# Patient Record
Sex: FEMALE | Race: WHITE | Employment: OTHER | URBAN - METROPOLITAN AREA
[De-identification: names, ages, dates, MRNs, and addresses within clinical notes are randomized per-mention and may not be internally consistent; named-entity substitution may affect disease eponyms.]

---

## 2017-02-07 ENCOUNTER — HOSPITAL ENCOUNTER (OUTPATIENT)
Dept: PREADMISSION TESTING | Age: 82
Discharge: HOME | End: 2017-02-07
Payer: MEDICARE

## 2017-02-07 ENCOUNTER — HOSPITAL ENCOUNTER (OUTPATIENT)
Dept: GENERAL RADIOLOGY | Age: 82
Discharge: HOME | End: 2017-02-07
Attending: SPECIALIST
Payer: MEDICARE

## 2017-02-07 VITALS
HEIGHT: 68 IN | DIASTOLIC BLOOD PRESSURE: 77 MMHG | HEART RATE: 68 BPM | WEIGHT: 121 LBS | BODY MASS INDEX: 18.34 KG/M2 | SYSTOLIC BLOOD PRESSURE: 144 MMHG

## 2017-02-07 DIAGNOSIS — Z01.811 PRE-OP CHEST EXAM: ICD-10-CM

## 2017-02-07 LAB
ALBUMIN SERPL BCP-MCNC: 4 G/DL (ref 3.5–5)
ALBUMIN/GLOB SERPL: 1.7 {RATIO} (ref 1.1–2.2)
ALP SERPL-CCNC: 62 U/L (ref 45–117)
ALT SERPL-CCNC: 27 U/L (ref 12–78)
ANION GAP BLD CALC-SCNC: 8 MMOL/L (ref 5–15)
AST SERPL W P-5'-P-CCNC: 24 U/L (ref 15–37)
BASOPHILS # BLD AUTO: 0 K/UL (ref 0–0.1)
BASOPHILS # BLD: 0 % (ref 0–1)
BILIRUB SERPL-MCNC: 0.4 MG/DL (ref 0.2–1)
BUN SERPL-MCNC: 18 MG/DL (ref 6–20)
BUN/CREAT SERPL: 28 (ref 12–20)
CALCIUM SERPL-MCNC: 9.6 MG/DL (ref 8.5–10.1)
CHLORIDE SERPL-SCNC: 101 MMOL/L (ref 97–108)
CO2 SERPL-SCNC: 29 MMOL/L (ref 21–32)
CREAT SERPL-MCNC: 0.64 MG/DL (ref 0.55–1.02)
EOSINOPHIL # BLD: 0.1 K/UL (ref 0–0.4)
EOSINOPHIL NFR BLD: 2 % (ref 0–7)
ERYTHROCYTE [DISTWIDTH] IN BLOOD BY AUTOMATED COUNT: 13.1 % (ref 11.5–14.5)
GLOBULIN SER CALC-MCNC: 2.4 G/DL (ref 2–4)
GLUCOSE SERPL-MCNC: 83 MG/DL (ref 65–100)
HCT VFR BLD AUTO: 38.6 % (ref 35–47)
HGB BLD-MCNC: 12.6 G/DL (ref 11.5–16)
LYMPHOCYTES # BLD AUTO: 24 % (ref 12–49)
LYMPHOCYTES # BLD: 1.6 K/UL (ref 0.8–3.5)
MCH RBC QN AUTO: 30.8 PG (ref 26–34)
MCHC RBC AUTO-ENTMCNC: 32.6 G/DL (ref 30–36.5)
MCV RBC AUTO: 94.4 FL (ref 80–99)
MONOCYTES # BLD: 0.7 K/UL (ref 0–1)
MONOCYTES NFR BLD AUTO: 10 % (ref 5–13)
NEUTS SEG # BLD: 4.1 K/UL (ref 1.8–8)
NEUTS SEG NFR BLD AUTO: 64 % (ref 32–75)
PLATELET # BLD AUTO: 315 K/UL (ref 150–400)
POTASSIUM SERPL-SCNC: 3.8 MMOL/L (ref 3.5–5.1)
PROT SERPL-MCNC: 6.4 G/DL (ref 6.4–8.2)
RBC # BLD AUTO: 4.09 M/UL (ref 3.8–5.2)
SODIUM SERPL-SCNC: 138 MMOL/L (ref 136–145)
WBC # BLD AUTO: 6.4 K/UL (ref 3.6–11)

## 2017-02-07 PROCEDURE — 80053 COMPREHEN METABOLIC PANEL: CPT | Performed by: SPECIALIST

## 2017-02-07 PROCEDURE — 36415 COLL VENOUS BLD VENIPUNCTURE: CPT | Performed by: SPECIALIST

## 2017-02-07 PROCEDURE — 71020 XR CHEST PA LAT: CPT

## 2017-02-07 PROCEDURE — 85025 COMPLETE CBC W/AUTO DIFF WBC: CPT | Performed by: SPECIALIST

## 2017-02-07 RX ORDER — BENZONATATE 200 MG/1
200 CAPSULE ORAL AS NEEDED
COMMUNITY

## 2017-02-07 RX ORDER — GLUCOSAMINE SULFATE 1500 MG
POWDER IN PACKET (EA) ORAL DAILY
COMMUNITY

## 2017-02-07 NOTE — PERIOP NOTES
PREOPERATIVE INSTRUCTIONS REVIEWED WITH PATIENT. PATIENT WAS GIVEN THE  OPPORTUNITY TO ASK QUESTIONS ON THE INFORMATION PROVIDED.

## 2017-02-08 NOTE — PERIOP NOTES
EKG SEEN BY  [address]. THIS INFORMATION WAS FAXED TO  [address] [place] [address],[address].

## 2017-02-08 NOTE — PERIOP NOTES
EKG FAXED TO Sagar Donovan PCP TO [facility] AS REQUESTED BY HER. LEFT VOICEMAIL MSG REGARDING EKG BEING FROM 12/2/16  RECEIVED ORDERS FROM [facility]. 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 ORDER FAXED TO DR. SALAS TO INFORM HIM. ORDER PLACED ON TOP OF CHART FOR HOLDING FOR DOS.

## 2017-02-14 ENCOUNTER — ANESTHESIA (OUTPATIENT)
Dept: SURGERY | Age: 82
End: 2017-02-14
Payer: MEDICARE

## 2017-02-14 ENCOUNTER — ANESTHESIA EVENT (OUTPATIENT)
Dept: SURGERY | Age: 82
End: 2017-02-14
Payer: MEDICARE

## 2017-02-14 ENCOUNTER — HOSPITAL ENCOUNTER (OUTPATIENT)
Age: 82
Setting detail: OUTPATIENT SURGERY
Discharge: HOME | End: 2017-02-14
Attending: SPECIALIST | Admitting: SPECIALIST
Payer: MEDICARE

## 2017-02-14 ENCOUNTER — SURGERY (OUTPATIENT)
Age: 82
End: 2017-02-14

## 2017-02-14 VITALS
HEIGHT: 68 IN | HEART RATE: 73 BPM | SYSTOLIC BLOOD PRESSURE: 138 MMHG | DIASTOLIC BLOOD PRESSURE: 62 MMHG | BODY MASS INDEX: 18.34 KG/M2 | RESPIRATION RATE: 16 BRPM | WEIGHT: 121 LBS | TEMPERATURE: 97.3 F | OXYGEN SATURATION: 99 %

## 2017-02-14 PROCEDURE — 74011250636 HC RX REV CODE- 250/636

## 2017-02-14 PROCEDURE — 74011000272 HC RX REV CODE- 272: Performed by: SPECIALIST

## 2017-02-14 PROCEDURE — 76010000149 HC OR TIME 1 TO 1.5 HR: Performed by: SPECIALIST

## 2017-02-14 PROCEDURE — 77030008684 HC TU ET CUF COVD -B: Performed by: ANESTHESIOLOGY

## 2017-02-14 PROCEDURE — 77030013079 HC BLNKT BAIR HGGR 3M -A: Performed by: ANESTHESIOLOGY

## 2017-02-14 PROCEDURE — 74011250637 HC RX REV CODE- 250/637: Performed by: SPECIALIST

## 2017-02-14 PROCEDURE — 77030026438 HC STYL ET INTUB CARD -A: Performed by: ANESTHESIOLOGY

## 2017-02-14 PROCEDURE — 76060000033 HC ANESTHESIA 1 TO 1.5 HR: Performed by: SPECIALIST

## 2017-02-14 PROCEDURE — 77030008570 HC TBNG SUC IRR GRAC -B: Performed by: SPECIALIST

## 2017-02-14 PROCEDURE — 77030018846 HC SOL IRR STRL H20 ICUM -A: Performed by: SPECIALIST

## 2017-02-14 PROCEDURE — L8613 OSSICULAR IMPLANT: HCPCS | Performed by: SPECIALIST

## 2017-02-14 PROCEDURE — 77030006932 HC BLD TYMP BVR BD -B: Performed by: SPECIALIST

## 2017-02-14 PROCEDURE — 77030020782 HC GWN BAIR PAWS FLX 3M -B

## 2017-02-14 PROCEDURE — 74011000258 HC RX REV CODE- 258: Performed by: SPECIALIST

## 2017-02-14 PROCEDURE — 77030002974 HC SUT PLN J&J -A: Performed by: SPECIALIST

## 2017-02-14 PROCEDURE — 77030019908 HC STETH ESOPH SIMS -A: Performed by: ANESTHESIOLOGY

## 2017-02-14 PROCEDURE — 76210000021 HC REC RM PH II 0.5 TO 1 HR: Performed by: SPECIALIST

## 2017-02-14 PROCEDURE — 74011000250 HC RX REV CODE- 250

## 2017-02-14 PROCEDURE — 77030032490 HC SLV COMPR SCD KNE COVD -B: Performed by: SPECIALIST

## 2017-02-14 PROCEDURE — 77030006689 HC BLD OPHTH BVR BD -A: Performed by: SPECIALIST

## 2017-02-14 PROCEDURE — 74011250636 HC RX REV CODE- 250/636: Performed by: SPECIALIST

## 2017-02-14 PROCEDURE — 77030018836 HC SOL IRR NACL ICUM -A: Performed by: SPECIALIST

## 2017-02-14 PROCEDURE — 76210000016 HC OR PH I REC 1 TO 1.5 HR: Performed by: SPECIALIST

## 2017-02-14 PROCEDURE — 74011000250 HC RX REV CODE- 250: Performed by: SPECIALIST

## 2017-02-14 DEVICE — OFFSET ALTO PARTIAL SIZERS INCLUDED TITANIUM/SILICONE
Type: IMPLANTABLE DEVICE | Site: EAR | Status: FUNCTIONAL
Brand: OFFSET ALTO PARTIAL

## 2017-02-14 RX ORDER — ONDANSETRON 2 MG/ML
4 INJECTION INTRAMUSCULAR; INTRAVENOUS AS NEEDED
Status: DISCONTINUED | OUTPATIENT
Start: 2017-02-14 | End: 2017-02-14 | Stop reason: HOSPADM

## 2017-02-14 RX ORDER — FENTANYL CITRATE 50 UG/ML
INJECTION, SOLUTION INTRAMUSCULAR; INTRAVENOUS AS NEEDED
Status: DISCONTINUED | OUTPATIENT
Start: 2017-02-14 | End: 2017-02-14 | Stop reason: HOSPADM

## 2017-02-14 RX ORDER — LIDOCAINE HYDROCHLORIDE 10 MG/ML
0.1 INJECTION, SOLUTION EPIDURAL; INFILTRATION; INTRACAUDAL; PERINEURAL AS NEEDED
Status: DISCONTINUED | OUTPATIENT
Start: 2017-02-14 | End: 2017-02-14 | Stop reason: HOSPADM

## 2017-02-14 RX ORDER — LIDOCAINE HYDROCHLORIDE AND EPINEPHRINE 10; 10 MG/ML; UG/ML
1.5 INJECTION, SOLUTION INFILTRATION; PERINEURAL ONCE
Status: COMPLETED | OUTPATIENT
Start: 2017-02-14 | End: 2017-02-14

## 2017-02-14 RX ORDER — IBUPROFEN 800 MG/1
800 TABLET ORAL
Qty: 60 TAB | Refills: 1 | Status: SHIPPED | OUTPATIENT
Start: 2017-02-14 | End: 2019-04-22

## 2017-02-14 RX ORDER — SODIUM CHLORIDE 0.9 % (FLUSH) 0.9 %
5-10 SYRINGE (ML) INJECTION AS NEEDED
Status: DISCONTINUED | OUTPATIENT
Start: 2017-02-14 | End: 2017-02-14 | Stop reason: HOSPADM

## 2017-02-14 RX ORDER — LIDOCAINE HYDROCHLORIDE 20 MG/ML
INJECTION, SOLUTION EPIDURAL; INFILTRATION; INTRACAUDAL; PERINEURAL AS NEEDED
Status: DISCONTINUED | OUTPATIENT
Start: 2017-02-14 | End: 2017-02-14 | Stop reason: HOSPADM

## 2017-02-14 RX ORDER — DIPHENHYDRAMINE HYDROCHLORIDE 50 MG/ML
12.5 INJECTION, SOLUTION INTRAMUSCULAR; INTRAVENOUS AS NEEDED
Status: DISCONTINUED | OUTPATIENT
Start: 2017-02-14 | End: 2017-02-14 | Stop reason: HOSPADM

## 2017-02-14 RX ORDER — AMOXICILLIN AND CLAVULANATE POTASSIUM 875; 125 MG/1; MG/1
1 TABLET, FILM COATED ORAL 2 TIMES DAILY
Qty: 14 TAB | Refills: 0 | Status: SHIPPED | OUTPATIENT
Start: 2017-02-14 | End: 2017-02-21

## 2017-02-14 RX ORDER — ACETAMINOPHEN 10 MG/ML
INJECTION, SOLUTION INTRAVENOUS AS NEEDED
Status: DISCONTINUED | OUTPATIENT
Start: 2017-02-14 | End: 2017-02-14 | Stop reason: HOSPADM

## 2017-02-14 RX ORDER — BACITRACIN ZINC 500 UNIT/G
OINTMENT (GRAM) TOPICAL DAILY
Status: DISCONTINUED | OUTPATIENT
Start: 2017-02-15 | End: 2017-02-14 | Stop reason: HOSPADM

## 2017-02-14 RX ORDER — MORPHINE SULFATE 10 MG/ML
2 INJECTION, SOLUTION INTRAMUSCULAR; INTRAVENOUS
Status: DISCONTINUED | OUTPATIENT
Start: 2017-02-14 | End: 2017-02-14 | Stop reason: HOSPADM

## 2017-02-14 RX ORDER — SUCCINYLCHOLINE CHLORIDE 20 MG/ML
INJECTION INTRAMUSCULAR; INTRAVENOUS AS NEEDED
Status: DISCONTINUED | OUTPATIENT
Start: 2017-02-14 | End: 2017-02-14 | Stop reason: HOSPADM

## 2017-02-14 RX ORDER — SODIUM CHLORIDE, SODIUM LACTATE, POTASSIUM CHLORIDE, CALCIUM CHLORIDE 600; 310; 30; 20 MG/100ML; MG/100ML; MG/100ML; MG/100ML
INJECTION, SOLUTION INTRAVENOUS
Status: DISCONTINUED | OUTPATIENT
Start: 2017-02-14 | End: 2017-02-14 | Stop reason: HOSPADM

## 2017-02-14 RX ORDER — DEXAMETHASONE SODIUM PHOSPHATE 4 MG/ML
INJECTION, SOLUTION INTRA-ARTICULAR; INTRALESIONAL; INTRAMUSCULAR; INTRAVENOUS; SOFT TISSUE AS NEEDED
Status: DISCONTINUED | OUTPATIENT
Start: 2017-02-14 | End: 2017-02-14 | Stop reason: HOSPADM

## 2017-02-14 RX ORDER — FENTANYL CITRATE 50 UG/ML
50 INJECTION, SOLUTION INTRAMUSCULAR; INTRAVENOUS AS NEEDED
Status: DISCONTINUED | OUTPATIENT
Start: 2017-02-14 | End: 2017-02-14 | Stop reason: HOSPADM

## 2017-02-14 RX ORDER — EPINEPHRINE NASAL SOLUTION 1 MG/ML
2 SOLUTION NASAL
Status: COMPLETED | OUTPATIENT
Start: 2017-02-14 | End: 2017-02-14

## 2017-02-14 RX ORDER — MIDAZOLAM HYDROCHLORIDE 1 MG/ML
1 INJECTION, SOLUTION INTRAMUSCULAR; INTRAVENOUS AS NEEDED
Status: DISCONTINUED | OUTPATIENT
Start: 2017-02-14 | End: 2017-02-14 | Stop reason: HOSPADM

## 2017-02-14 RX ORDER — PROPOFOL 10 MG/ML
INJECTION, EMULSION INTRAVENOUS AS NEEDED
Status: DISCONTINUED | OUTPATIENT
Start: 2017-02-14 | End: 2017-02-14 | Stop reason: HOSPADM

## 2017-02-14 RX ORDER — ROCURONIUM BROMIDE 10 MG/ML
INJECTION, SOLUTION INTRAVENOUS AS NEEDED
Status: DISCONTINUED | OUTPATIENT
Start: 2017-02-14 | End: 2017-02-14 | Stop reason: HOSPADM

## 2017-02-14 RX ORDER — SODIUM CHLORIDE, SODIUM LACTATE, POTASSIUM CHLORIDE, CALCIUM CHLORIDE 600; 310; 30; 20 MG/100ML; MG/100ML; MG/100ML; MG/100ML
125 INJECTION, SOLUTION INTRAVENOUS CONTINUOUS
Status: DISCONTINUED | OUTPATIENT
Start: 2017-02-14 | End: 2017-02-14 | Stop reason: HOSPADM

## 2017-02-14 RX ORDER — PHENYLEPHRINE HCL IN 0.9% NACL 0.4MG/10ML
SYRINGE (ML) INTRAVENOUS AS NEEDED
Status: DISCONTINUED | OUTPATIENT
Start: 2017-02-14 | End: 2017-02-14 | Stop reason: HOSPADM

## 2017-02-14 RX ORDER — OFLOXACIN 3 MG/ML
SOLUTION/ DROPS OPHTHALMIC AS NEEDED
Status: DISCONTINUED | OUTPATIENT
Start: 2017-02-14 | End: 2017-02-14 | Stop reason: HOSPADM

## 2017-02-14 RX ORDER — OXYCODONE AND ACETAMINOPHEN 5; 325 MG/1; MG/1
1 TABLET ORAL AS NEEDED
Status: DISCONTINUED | OUTPATIENT
Start: 2017-02-14 | End: 2017-02-14 | Stop reason: HOSPADM

## 2017-02-14 RX ORDER — MIDAZOLAM HYDROCHLORIDE 1 MG/ML
0.5 INJECTION, SOLUTION INTRAMUSCULAR; INTRAVENOUS
Status: DISCONTINUED | OUTPATIENT
Start: 2017-02-14 | End: 2017-02-14 | Stop reason: HOSPADM

## 2017-02-14 RX ORDER — SODIUM CHLORIDE 9 MG/ML
1000 INJECTION, SOLUTION INTRAVENOUS CONTINUOUS
Status: DISCONTINUED | OUTPATIENT
Start: 2017-02-14 | End: 2017-02-14 | Stop reason: HOSPADM

## 2017-02-14 RX ORDER — SODIUM CHLORIDE 9 MG/ML
50 INJECTION, SOLUTION INTRAVENOUS CONTINUOUS
Status: DISCONTINUED | OUTPATIENT
Start: 2017-02-14 | End: 2017-02-14 | Stop reason: HOSPADM

## 2017-02-14 RX ORDER — OFLOXACIN 3 MG/ML
5 SOLUTION AURICULAR (OTIC) 2 TIMES DAILY
Qty: 10 ML | Refills: 3 | Status: SHIPPED | OUTPATIENT
Start: 2017-02-14 | End: 2018-10-11

## 2017-02-14 RX ORDER — DEXAMETHASONE SODIUM PHOSPHATE 10 MG/ML
10 INJECTION INTRAMUSCULAR; INTRAVENOUS ONCE
Status: DISCONTINUED | OUTPATIENT
Start: 2017-02-14 | End: 2017-02-14 | Stop reason: HOSPADM

## 2017-02-14 RX ORDER — ONDANSETRON 2 MG/ML
INJECTION INTRAMUSCULAR; INTRAVENOUS AS NEEDED
Status: DISCONTINUED | OUTPATIENT
Start: 2017-02-14 | End: 2017-02-14 | Stop reason: HOSPADM

## 2017-02-14 RX ORDER — SODIUM CHLORIDE 0.9 % (FLUSH) 0.9 %
5-10 SYRINGE (ML) INJECTION EVERY 8 HOURS
Status: DISCONTINUED | OUTPATIENT
Start: 2017-02-14 | End: 2017-02-14 | Stop reason: HOSPADM

## 2017-02-14 RX ORDER — CIPROFLOXACIN AND DEXAMETHASONE 3; 1 MG/ML; MG/ML
4 SUSPENSION/ DROPS AURICULAR (OTIC) 2 TIMES DAILY
Status: DISCONTINUED | OUTPATIENT
Start: 2017-02-14 | End: 2017-02-14 | Stop reason: HOSPADM

## 2017-02-14 RX ORDER — FENTANYL CITRATE 50 UG/ML
25 INJECTION, SOLUTION INTRAMUSCULAR; INTRAVENOUS
Status: DISCONTINUED | OUTPATIENT
Start: 2017-02-14 | End: 2017-02-14 | Stop reason: HOSPADM

## 2017-02-14 RX ORDER — LIDOCAINE HYDROCHLORIDE AND EPINEPHRINE 10; 10 MG/ML; UG/ML
INJECTION, SOLUTION INFILTRATION; PERINEURAL AS NEEDED
Status: DISCONTINUED | OUTPATIENT
Start: 2017-02-14 | End: 2017-02-14 | Stop reason: HOSPADM

## 2017-02-14 RX ORDER — HYDROMORPHONE HYDROCHLORIDE 1 MG/ML
0.2 INJECTION, SOLUTION INTRAMUSCULAR; INTRAVENOUS; SUBCUTANEOUS
Status: DISCONTINUED | OUTPATIENT
Start: 2017-02-14 | End: 2017-02-14 | Stop reason: HOSPADM

## 2017-02-14 RX ADMIN — LIDOCAINE HYDROCHLORIDE AND EPINEPHRINE 9 ML: 10; 10 INJECTION, SOLUTION INFILTRATION; PERINEURAL at 13:28

## 2017-02-14 RX ADMIN — LIDOCAINE HYDROCHLORIDE AND EPINEPHRINE 1 ML: 10; 10 INJECTION, SOLUTION INFILTRATION; PERINEURAL at 13:35

## 2017-02-14 RX ADMIN — ONDANSETRON 4 MG: 2 INJECTION INTRAMUSCULAR; INTRAVENOUS at 13:25

## 2017-02-14 RX ADMIN — LIDOCAINE HYDROCHLORIDE AND EPINEPHRINE 8 ML: 10; 10 INJECTION, SOLUTION INFILTRATION; PERINEURAL at 14:11

## 2017-02-14 RX ADMIN — Medication 80 MCG: at 13:44

## 2017-02-14 RX ADMIN — Medication 80 MCG: at 13:42

## 2017-02-14 RX ADMIN — LIDOCAINE HYDROCHLORIDE 60 MG: 20 INJECTION, SOLUTION EPIDURAL; INFILTRATION; INTRACAUDAL; PERINEURAL at 13:15

## 2017-02-14 RX ADMIN — Medication 2 DROP: at 13:34

## 2017-02-14 RX ADMIN — SODIUM CHLORIDE, SODIUM LACTATE, POTASSIUM CHLORIDE, CALCIUM CHLORIDE: 600; 310; 30; 20 INJECTION, SOLUTION INTRAVENOUS at 13:11

## 2017-02-14 RX ADMIN — BACITRACIN ZINC 1 G: 500 OINTMENT TOPICAL at 14:08

## 2017-02-14 RX ADMIN — FENTANYL CITRATE 100 MCG: 50 INJECTION, SOLUTION INTRAMUSCULAR; INTRAVENOUS at 13:15

## 2017-02-14 RX ADMIN — DEXAMETHASONE SODIUM PHOSPHATE 10 MG: 4 INJECTION, SOLUTION INTRA-ARTICULAR; INTRALESIONAL; INTRAMUSCULAR; INTRAVENOUS; SOFT TISSUE at 13:25

## 2017-02-14 RX ADMIN — PROPOFOL 150 MG: 10 INJECTION, EMULSION INTRAVENOUS at 13:15

## 2017-02-14 RX ADMIN — Medication 80 MCG: at 13:47

## 2017-02-14 RX ADMIN — ROCURONIUM BROMIDE 10 MG: 10 INJECTION, SOLUTION INTRAVENOUS at 13:15

## 2017-02-14 RX ADMIN — GELATIN ABSORBABLE SPONGE SIZE 100 1 EACH: MISC at 13:34

## 2017-02-14 RX ADMIN — ACETAMINOPHEN 650 MG: 10 INJECTION, SOLUTION INTRAVENOUS at 13:58

## 2017-02-14 RX ADMIN — SUCCINYLCHOLINE CHLORIDE 100 MG: 20 INJECTION INTRAMUSCULAR; INTRAVENOUS at 13:15

## 2017-02-14 RX ADMIN — OFLOXACIN 3 DROP: 3 SOLUTION OPHTHALMIC at 13:34

## 2017-02-14 RX ADMIN — SODIUM CHLORIDE 1.5 G: 900 INJECTION, SOLUTION INTRAVENOUS at 13:23

## 2017-02-14 NOTE — ROUTINE PROCESS
Patient: Scotty Thomas MRN: [id number]  SSN: [id number]   YOB: 1935  Age: [de-identified] y.o. Sex: female     Patient is status post Procedure(s):  RIGHT TYMPANOPLASTY, CARTILAGE GRAFT, OSSICULAR CHAIN RECONSTRUCTION. Surgeon(s) and Role:     * Ely Grajeda MD - Primary    Local/Dose/Irrigation:  18 ML 1% LIDOCAINE WITH EPINEPHRINE WAS INJECTED INTO PATIENT'S RIGHT EAR    Saline irrigation to sterile field. Peripheral IV 02/14/17 Left Wrist (Active)            Airway - Endotracheal Tube 02/14/17 Oral (Active)                   Dressing/Packing:  Wound Ear Right-DRESSING TYPE: Other (Comment); Cotton ball(s) (BACITRACIN OINTMENT) (02/14/17 1410)  Splint/Cast:  ]    Other:  EXTRA OFLOXACIN DROPS SENT WITH PATIENT TO TAKE HOME

## 2017-02-14 NOTE — IP AVS SNAPSHOT
[address] 
[phone] Patient: Aldair Nance MRN: [id number] GYV:9/34/[birth year] You are allergic to the following Allergen Reactions Latex Rash Amantadine Other (comments) \"My mind doesn't think correctly\", depression Opioids - Morphine Analogues Other (comments) \"My mind doesn't think correctly\", depression Recent Documentation Height Weight BMI OB Status Smoking Status 1.727 m 54.9 kg 18.4 kg/m2 Hysterectomy Never Smoker Emergency Contacts Name Discharge Info Relation Home Work Mobile N[employer] 38 CAREGIVER [3] Spouse [3] [phone] About your hospitalization You were admitted on:  February 14, 2017 [facility] You were discharged on:  February 14, 2017 Unit phone number:  [phone] Why you were hospitalized Your primary diagnosis was:  Not on File Providers Seen During Your Hospitalizations Provider Role Specialty Primary office phone Marissa Pedroza MD Attending Provider Otolaryngology [phone] Your Primary Care Physician (PCP) Primary Care Physician Office Phone Office Fax Ksenia Holloway [phone] Follow-up Information Follow up With Details Comments Contact Info Hui Valles MD   [facility] [address] [facility] [address] [phone] Marissa Pedroza MD Schedule an appointment as soon as possible for a visit in 8 day(s)  [address] [address] [place] and [address] [facility] 7 [address] [phone] Your Appointments Monday March 06, 2017  9:00 AM EST  
ECHO CARDIOGRAMS 2D with ECHO, [facility] ([facility] SCHEDULING) [address] [facility] 57  
[phone] Monday March 06, 2017  9:40 AM EST ESTABLISHED PATIENT with Elliot Cox MD  
[facility] ([facility]) [address]  [address] [facility]  
[phone] Current Discharge Medication List  
  
START taking these medications Dose & Instructions Dispensing Information Comments Morning Noon Evening Bedtime  
 amoxicillin-clavulanate 875-125 mg per tablet Commonly known as:  AUGMENTIN Your next dose is: Today, Tomorrow Other:  _________ Dose:  1 Tab Take 1 Tab by mouth two (2) times a day for 7 days. Quantity:  14 Tab Refills:  0  
     
   
   
   
  
 ibuprofen 800 mg tablet Commonly known as:  MOTRIN Your next dose is: Today, Tomorrow Other:  _________ Dose:  800 mg Take 1 Tab by mouth every six (6) hours as needed for Pain. Quantity:  60 Tab Refills:  1  
     
   
   
   
  
 ofloxacin 0.3 % otic solution Commonly known as:  FLOXIN Your next dose is: Today, Tomorrow Other:  _________ Dose:  5 Drop Administer 5 Drops in left ear two (2) times a day. Quantity:  10 mL Refills:  3 CONTINUE these medications which have NOT CHANGED Dose & Instructions Dispensing Information Comments Morning Noon Evening Bedtime  
 aspirin 81 mg tablet Your next dose is: Today, Tomorrow Other:  _________ Dose:  81 mg Take 81 mg by mouth daily. Refills:  0  
     
   
   
   
  
 benzonatate 200 mg capsule Commonly known as:  TESSALON Your next dose is: Today, Tomorrow Other:  _________ Dose:  200 mg Take 200 mg by mouth as needed for Cough. Refills:  0 DYMISTA 137-50 mcg/spray [place] Generic drug:  azelastine-fluticasone Your next dose is: Today, Tomorrow Other:  _________ Dose:  1 Spray 1 [place] by Both Nostrils route as needed. Up to twice daily Refills:  0  
     
   
   
   
  
 montelukast 10 mg tablet Commonly known as:  SINGULAIR Your next dose is: Today, Tomorrow Other:  _________ Dose:  1 Tab Take 1 Tab by mouth as needed. Refills:  0  
     
   
   
   
  
 TUMS 300 mg (750 mg) chewable tablet Generic drug:  calcium carbonate Your next dose is: Today, Tomorrow Other:  _________ Dose:  1 Tab Take 1 Tab by mouth daily. Refills:  0  
     
   
   
   
  
 VITAMIN D3 1,000 unit Cap Generic drug:  cholecalciferol Your next dose is: Today, Tomorrow Other:  _________ Take  by mouth daily. Refills:  0 Where to Get Your Medications Information on where to get these meds will be given to you by the nurse or doctor. ! Ask your nurse or doctor about these medications  
  amoxicillin-clavulanate 875-125 mg per tablet  
 ibuprofen 800 mg tablet  
 ofloxacin 0.3 % otic solution Discharge Instructions [id number] [address] POSTOPERATIVE INSTRUCTIONS 
FOR PATIENTS UNDERGOING 
CHRONIC EAR, MIDDLE EAR OR STAPES SURGERY 1. Do not blow your nose for three weeks following surgery. If you sneeze or cough do so with your mouth open. 2. Avoid any heavy lifting (over 10 lbs.), straining or bending for three weeks following surgery. 3. Keep your head elevated as much as possible x 1 week. Sleep and rest on two to three pillows if possible. 4. Do not get water in your ear. If showering or washing your hair place a piece of cotton coated in Vaseline in the ear canal to seal it x  3weeks 5. Beginning one day after surgery try to leave the cotton out of our ear as much as possible unless there is significant drainage. 6. Some drainage from your ear canal may occur after surgery. If there is a separate incision some drainage may occur from this area also.   If the drainage is profuse or develops a foul odor please call. 7. Popping sounds, a plugged sensation, ringing or fluctuating hearing may be noticed in the ear during the healing. 8. Avoid travel by air for three weeks following surgery. 9. If you should notice any swelling, redness or excessive pain please call. 10. Some dizziness may occur after surgery. If it becomes severe or is associated with nausea or vomiting please call. 11. Please call [facility] [address] to make an appointment to be seen 7-10 days after the time of your surgery unless stated otherwise by your physician. I understand and acknowledge receipt of the instructions indicated above. Physician's or R.N.'s Signature                                                                  Date/Time Patient or Representative Signature                                                          Date/Time 
 
 
 
 
[address] [place]. Jimmy Alas M.D. 
Alfa Burks. [facility], [address] [place] [address] 
[phone] DISCHARGE SUMMARY from Nurse PATIENT INSTRUCTIONS: 
 
After general anesthesia or intravenous sedation, for 24 hours or while taking prescription Narcotics: · Limit your activities · Do not drive and operate hazardous machinery · Do not make important personal or business decisions · Do  not drink alcoholic beverages · If you have not urinated within 8 hours after discharge, please contact your surgeon on call. Report the following to your surgeon: 
· Excessive pain, swelling, redness or odor of or around the surgical area · Temperature over 100.5 · Nausea and vomiting lasting longer than 4 hours or if unable to take medications · Any signs of decreased circulation or nerve impairment to extremity: change in color, persistent  numbness, tingling, coldness or increase pain · Any questions The discharge information has been reviewed with the patient and spouse. The patient and spouse verbalized understanding. Discharge medications reviewed with the patient and spouse and appropriate educational materials and side effects teaching were provided. Discharge Orders None Introducing [facility]! Dear Dillon Cherry: Thank you for requesting a Materia account. Our records indicate that you already have an active Materia account. You can access your account anytime at [email or web address] Did you know that you can access your hospital and ER discharge instructions at any time in Materia? You can also review all of your test results from your hospital stay or ER visit. Additional Information If you have questions, please visit the Frequently Asked Questions section of the Materia website at [email or web address] Remember, Materia is NOT to be used for urgent needs. For medical emergencies, dial 911. Now available from your iPhone and Android! General Information Please provide this summary of care documentation to your next provider. Patient Signature:  ____________________________________________________________ Date:  ____________________________________________________________  
  
[place]  Provider Signature:  ____________________________________________________________ Date:  ____________________________________________________________

## 2017-02-14 NOTE — ANESTHESIA POSTPROCEDURE EVALUATION
Post-Anesthesia Evaluation and Assessment    Patient: Matti Arias MRN: [id number]  SSN: [id number]    YOB: 1935  Age: [de-identified] y.o. Sex: female       Cardiovascular Function/Vital Signs  Visit Vitals    /62    Pulse 73    Temp 36.3 °C (97.3 °F)    Resp 16    Ht 5' 8\" (1.727 m)    Wt 54.9 kg (121 lb)    SpO2 99%    BMI 18.4 kg/m2       Patient is status post general anesthesia for Procedure(s):  RIGHT TYMPANOPLASTY, CARTILAGE GRAFT, OSSICULAR CHAIN RECONSTRUCTION. Nausea/Vomiting: None    Postoperative hydration reviewed and adequate. Pain:  Pain Scale 1: Numeric (0 - 10) (02/14/17 1435)  Pain Intensity 1: 0 (02/14/17 1435)   Managed    Neurological Status:   Neuro (WDL): Exceptions to WDL (02/14/17 1435)  Neuro  Neurologic State: Drowsy (02/14/17 1435)  Orientation Level: Oriented X4 (02/14/17 1435)  Cognition: Follows commands (02/14/17 1435)  Speech: Clear (02/14/17 1435)   At baseline    Mental Status and Level of Consciousness: Arousable    Pulmonary Status:   O2 Device: Room air (02/14/17 1438)   Adequate oxygenation and airway patent    Complications related to anesthesia: None    Post-anesthesia assessment completed.  No concerns    Signed By: Elly Gaucher., MD     February 14, 2017

## 2017-02-14 NOTE — OP NOTES
OPERATIVE REPORT     PREOPERATIVE DIAGNOSIS: Right chronic otitis media. POSTOPERATIVE DIAGNOSIS: Right chronic otitis media. OPERATIVE PROCEDURES:   1. Right EnduralTympanoplasty  with Ossicular Chain Reconstruction. 2. Cartilage graft through a separate incision. 3. Facial nerve monitoring x1 hour. 4. Placement of facial nerve electrodes. 5. Microdissection. SURGEON: Precious Fuentes MD     ESTIMATED BLOOD LOSS: 5 mL. SPECIMEN: None    ANESTHESIA: General.     COMPLICATIONS: None. FINDINGS:     1. Fused Malleus and Incus, Head of malleus removed and incus removed  2. Inferior Perforation 25 %   3. [facility] Off centered Titanium prosthesis placed   DESCRIPTION OF PROCEDURE:     After the patient received informed consent, the patient  was taken to the operating room. The patient was kept in the supine   position, was prepped and draped in the usual fashion. After the   administration of general anesthesia, the table was turned 180 degrees away   from the neutral position. Facial nerve electrodes were placed in the Right orbicularis oris and Right orbicularis oculi muscles. Continuous facial nerve monitoring took place   for the entire case. The baseline facial muscle activity was less than 10   mA. There was no aberrant facial nerve stimulation throughout the entire   case. The electrode impedances were checked preoperatively and found to be   less than 1.0 kilohms. 15 cc of 1 % lidocaine with epinephrine was injected into and around the Right ear     The operating microscope was used for the entire case. The Right ear was   examined A tragal incision was made. Deeper layer of tissue was incised and   a 1 x 1 cm piece of tragal cartilage was harvested and placed on a Kareem   block. This incision was then closed using a chromic stitch. Next, using a   speculum ennis, a tympanomeatal flap was constructed and raised. The   middle ear was entered.  The edges of the perforation were revitalized using sharp dissection. The perforation was 25% of the eardrum anterior inferiorly based. The chorda tympani was identified and    preserved. The middle ear was entered. Next, 30 minutes took place toward dissecting the tympanomeatal flap and   preserving the chorda tympani nerve. The ossicular chain was palpated and the incus and malleus were fused together. The IS joint was severed using a joint knife. The Stapes and stapes footplate were mobile. The incus and malleus head were removed. Next, the area was measured and a Doppelganger titnaium partial middle ear prosthesis was brought to the table and placed upon the capitulum of the stapes in good apposition. Once this was performed, the middle ear was packed with Gelfoam.   The cartilage graft, which was harvested earlier, was then placed lateral   to the prosthesis. The external auditory canal was then packed with Ciprodex and Gelfoam after the tympanomeatal flap was reapproximated. All counts were correct at the end of the case. The patient tolerated the procedure well and went to PACU   in stable condition.        Celia Love MD   cc: Mauricio Damon MD

## 2017-02-14 NOTE — DISCHARGE INSTRUCTIONS
[facility]  POSTOPERATIVE INSTRUCTIONS  FOR PATIENTS UNDERGOING  CHRONIC EAR, MIDDLE EAR OR STAPES SURGERY    1. Do not blow your nose for three weeks following surgery. If you sneeze or cough do so with your mouth open. 2. Avoid any heavy lifting (over 10 lbs.), straining or bending for three weeks following surgery. 3. Keep your head elevated as much as possible x 1 week. Sleep and rest on two to three pillows if possible. 4. Do not get water in your ear. If showering or washing your hair place a piece of cotton coated in Vaseline in the ear canal to seal it x  3weeks       5. Beginning one day after surgery try to leave the cotton out of our ear as much as possible unless there is significant drainage. 6. Some drainage from your ear canal may occur after surgery. If there is a separate incision some drainage may occur from this area also. If the drainage is profuse or develops a foul odor please call. 7. Popping sounds, a plugged sensation, ringing or fluctuating hearing may be noticed in the ear during the healing. 8. Avoid travel by air for three weeks following surgery. 9. If you should notice any swelling, redness or excessive pain please call. 10. Some dizziness may occur after surgery. If it becomes severe or is associated with nausea or vomiting please call. 11. Please call [facility] [address] to make an appointment to be seen 7-10 days after the time of your surgery unless stated otherwise by your physician. I understand and acknowledge receipt of the instructions indicated above.                                                                                                                                            Physician's or R.N.'s Signature                                                                  Date/Time Patient or Representative Signature                                                          Date/Time          [address] Amos M.D.  [address]  [phone]      DISCHARGE SUMMARY from Nurse      PATIENT INSTRUCTIONS:    After general anesthesia or intravenous sedation, for 24 hours or while taking prescription Narcotics:  · Limit your activities  · Do not drive and operate hazardous machinery  · Do not make important personal or business decisions  · Do  not drink alcoholic beverages  · If you have not urinated within 8 hours after discharge, please contact your surgeon on call. Report the following to your surgeon:  · Excessive pain, swelling, redness or odor of or around the surgical area  · Temperature over 100.5  · Nausea and vomiting lasting longer than 4 hours or if unable to take medications  · Any signs of decreased circulation or nerve impairment to extremity: change in color, persistent  numbness, tingling, coldness or increase pain  · Any questions      The discharge information has been reviewed with the patient and spouse. The patient and spouse verbalized understanding. Discharge medications reviewed with the patient and spouse and appropriate educational materials and side effects teaching were provided.

## 2017-02-14 NOTE — BRIEF OP NOTE
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 Item Serial No.  Lot No. LRB No. Used Action   PROSTHESES OSS PART 2X5. 6MM --  - SNA   PROSTHESES OSS PART 2X5. 6MM --  [facility] [id number] Right 1 Implanted

## 2017-03-06 ENCOUNTER — CLINICAL SUPPORT (OUTPATIENT)
Dept: CARDIOLOGY CLINIC | Age: 82
End: 2017-03-06

## 2017-03-06 ENCOUNTER — OFFICE VISIT (OUTPATIENT)
Dept: CARDIOLOGY CLINIC | Age: 82
End: 2017-03-06

## 2017-03-06 VITALS
DIASTOLIC BLOOD PRESSURE: 79 MMHG | RESPIRATION RATE: 16 BRPM | BODY MASS INDEX: 18.79 KG/M2 | WEIGHT: 124 LBS | HEIGHT: 68 IN | SYSTOLIC BLOOD PRESSURE: 118 MMHG

## 2017-03-06 DIAGNOSIS — I34.0 MODERATE MITRAL REGURGITATION: ICD-10-CM

## 2017-03-06 DIAGNOSIS — I34.0 MODERATE MITRAL REGURGITATION: Primary | ICD-10-CM

## 2017-03-06 DIAGNOSIS — I49.3 PVC (PREMATURE VENTRICULAR CONTRACTION): ICD-10-CM

## 2017-03-06 DIAGNOSIS — I10 BENIGN ESSENTIAL HTN: ICD-10-CM

## 2017-03-06 RX ORDER — AMOXICILLIN 500 MG/1
2000 CAPSULE ORAL
COMMUNITY
End: 2019-04-22

## 2017-03-06 RX ORDER — HYDROCHLOROTHIAZIDE 25 MG/1
TABLET ORAL
COMMUNITY
Start: 2016-12-07 | End: 2019-04-22

## 2017-03-06 NOTE — MR AVS SNAPSHOT
Visit Information Date & Time Provider Department Dept. Phone Encounter #  
 3/6/2017  9:40 AM Manuel Delaney MD [facility] Rad[facility] Smith [phone] [id number] Upcoming Health Maintenance Date Due DTaP/Tdap/Td series (1 - Tdap) 9/10/1957 ZOSTER VACCINE AGE 60> 9/10/1996 GLAUCOMA SCREENING Q2Y 9/10/2001 OSTEOPOROSIS SCREENING (DEXA) 9/10/2001 Pneumococcal 65+ Low/Medium Risk (1 of 2 - PCV13) 9/10/2001 MEDICARE YEARLY EXAM 9/10/2001 INFLUENZA AGE 9 TO ADULT 8/1/2016 Allergies as of 3/6/2017  Review Complete On: 3/6/2017 By: Gary Galvan Severity Noted Reaction Type Reactions Latex  10/03/2011    Rash Amantadine  12/02/2016    Other (comments) \"My mind doesn't think correctly\", depression Opioids - Morphine Analogues  12/02/2016    Other (comments) \"My mind doesn't think correctly\", depression Current Immunizations  Never Reviewed No immunizations on file. Not reviewed this visit Vitals BP Resp Height(growth percentile) Weight(growth percentile) BMI OB Status 118/79 (BP 1 Location: Left arm, BP Patient Position: Sitting) 16 5' 8\" (1.727 m) 124 lb (56.2 kg) 18.85 kg/m2 Hysterectomy Smoking Status Never Smoker BMI and BSA Data Body Mass Index Body Surface Area  
 18.85 kg/m 2 1.64 m 2 Your Updated Medication List  
  
   
This list is accurate as of: 3/6/17  9:48 AM.  Always use your most recent med list.  
  
  
  
  
 amoxicillin 500 mg capsule Commonly known as:  AMOXIL Take 2,000 mg by mouth. 4 capsules by mouth 1 hour prior to dental procedure  
  
 aspirin 81 mg tablet Take 81 mg by mouth daily. benzonatate 200 mg capsule Commonly known as:  TESSALON Take 200 mg by mouth as needed for Cough. DYMISTA 137-50 mcg/spray [place] Generic drug:  azelastine-fluticasone 1 [place] by Both Nostrils route as needed. Up to twice daily hydroCHLOROthiazide 25 mg tablet Commonly known as:  HYDRODIURIL  
1/4 tablet by mouth daily  
  
 ibuprofen 800 mg tablet Commonly known as:  MOTRIN Take 1 Tab by mouth every six (6) hours as needed for Pain.  
  
 montelukast 10 mg tablet Commonly known as:  SINGULAIR Take 1 Tab by mouth as needed. ofloxacin 0.3 % otic solution Commonly known as:  FLOXIN Administer 5 Drops in left ear two (2) times a day. TUMS 300 mg (750 mg) chewable tablet Generic drug:  calcium carbonate Take 1 Tab by mouth daily. TURMERIC ROOT EXTRACT PO Take 1,000 mg by mouth daily. VITAMIN D3 1,000 unit Cap Generic drug:  cholecalciferol Take  by mouth daily. Introducing [facility]! Dear Sharon Vincent: Thank you for requesting a [email or web address] account. Our records indicate that you already have an active [email or web address] account. You can access your account anytime at [email or web address] Did you know that you can access your hospital and ER discharge instructions at any time in [email or web address]? You can also review all of your test results from your hospital stay or ER visit. Additional Information If you have questions, please visit the Frequently Asked Questions section of the [email or web address] website at [email or web address] Remember, [email or web address] is NOT to be used for urgent needs. For medical emergencies, dial 911. Now available from your iPhone and Android! Please provide this summary of care documentation to your next provider. Your primary care clinician is listed as Miko Valentin. If you have any questions after today's visit, please call [phone].

## 2017-03-06 NOTE — LETTER
3/8/2017 10:46 PM 
 
Patient:  Shay Junior YOB: 1935 Date of Visit: 3/6/2017 Dear Aide Krueger MD 
[facility] [address] [place] [address] VIA Facsimile: [phone] 
 : 
Ms. Anushka Freeman is a [de-identified] yo F with family history of early coronary artery disease, mixed hyperlipidemia, rheumatic fever as a child, initially seen for chest pains. H/o PVCs and bigeminy. 8/2016 echo noted moderate MR, normal EF. Since her last visit, she has been doing well. No chest pain, shortness of breath, lightheadedness, dizziness or syncope. Her echocardiogram today was unchanged with normal LV function and moderate mitral regurgitation. She is compensated on exam with clear lungs and no lower extremity edema, a I-II/VI systolic murmur at the left upper sternal border. Blood pressure was 118/79. Fam hx. Father MI with CAD 45s, Paternal GF 45s, sister 78 CAD. Soc hx. No tobacco.  Retired. . Assessment and Plan: 1. Mitral regurgitation. Continues moderate in severity and she is asymptomatic. Will have her follow back in one year with a same day echocardiogram.  We did talk in the past about options going forward if things progressed in terms of mitral valve repair or clip. 2. PVCs. She has had these in the past and asymptomatic. If they become symptomatic in the future, could always add low dose beta-blocker. 3. Family history of early coronary artery disease. 4. Mixed hyperlipidemia. If you have questions, please do not hesitate to call me. Sincerely, Casimiro Bella MD

## 2017-03-06 NOTE — PROGRESS NOTES
[place] [address]: [place]  [id number]    History of Present Illness:  Ms. Ena Massey is a [de-identified] yo F with family history of early coronary artery disease, mixed hyperlipidemia, rheumatic fever as a child, initially seen for chest pains. H/o PVCs and bigeminy. 8/2016 echo noted moderate MR, normal EF. Since her last visit, she has been doing well. No chest pain, shortness of breath, lightheadedness, dizziness or syncope. Her echocardiogram today was unchanged with normal LV function and moderate mitral regurgitation. She is compensated on exam with clear lungs and no lower extremity edema, a I-II/VI systolic murmur at the left upper sternal border. Blood pressure was 118/79. Fam hx. Father MI with CAD 45s, Paternal GF 45s, sister 78 CAD. Soc hx. No tobacco.  Retired. . Assessment and Plan:   1. Mitral regurgitation. Continues moderate in severity and she is asymptomatic. Will have her follow back in one year with a same day echocardiogram.  We did talk in the past about options going forward if things progressed in terms of mitral valve repair or clip. 2. PVCs. She has had these in the past and asymptomatic. If they become symptomatic in the future, could always add low dose beta-blocker. 3. Family history of early coronary artery disease. 4. Mixed hyperlipidemia. She  has a past medical history of Arrhythmia; Other ill-defined conditions(799.89); and Rheumatic fever. She also has no past medical history of Unspecified sleep apnea. All other systems negative except as above. PE  Vitals:    03/06/17 0935   BP: 118/79   Resp: 16   Weight: 124 lb (56.2 kg)   Height: 5' 8\" (1.727 m)    Body mass index is 18.85 kg/(m^2).    General appearance - alert, well appearing, and in no distress  Mental status - affect appropriate to mood  Eyes - sclera anicteric, moist mucous membranes  Neck - supple, no JVD  Chest - clear to auscultation, no wheezes, rales or rhonchi  Heart - normal rate, regular rhythm, normal S1, S2, I/VI systolic murmur LUSB  Abdomen - soft, nontender, nondistended, no masses or organomegaly  Extremities - peripheral pulses normal, no pedal edema    Recent Labs:  No results found for: CHOL, CHOLX, CHLST, CHOLV, 338651, HDL, LDL, DLDL, LDLC, DLDLP, TGL, TGLX, TRIGL, [id number], TRIGP, CHHD, [facility]  Lab Results   Component Value Date/Time    Creatinine 0.64 02/07/2017 02:35 PM     Lab Results   Component Value Date/Time    BUN 18 02/07/2017 02:35 PM     Lab Results   Component Value Date/Time    Potassium 3.8 02/07/2017 02:35 PM     No results found for: HBA1C, HGBE8, OTZ3KPHJ, NCC3IJOV  Lab Results   Component Value Date/Time    HGB 12.6 02/07/2017 02:35 PM     Lab Results   Component Value Date/Time    PLATELET 920 07/82/8185 02:35 PM       Reviewed:  Past Medical History:   Diagnosis Date    Arrhythmia     palpitations - no problems. 2/7/17: OCASSSIONALLY & BEING OBSERVED BY DR. Nellie Zavala. MITRAL VALVE REGURGITATION R/T RHUMATIC FEVER    Other ill-defined conditions(799.89)     rheumatic fever and polio as child    Rheumatic fever     AS A CHILD. History   Smoking Status    Never Smoker   Smokeless Tobacco    Never Used     History   Alcohol Use    4.2 oz/week    7 Glasses of wine per week     Comment: glass of wine or mixed drink - one per night     Allergies   Allergen Reactions    Latex Rash    Amantadine Other (comments)     \"My mind doesn't think correctly\", depression    Opioids - Morphine Analogues Other (comments)     \"My mind doesn't think correctly\", depression       Current Outpatient Prescriptions   Medication Sig    TURMERIC ROOT EXTRACT PO Take 1,000 mg by mouth daily.  ofloxacin (FLOXIN) 0.3 % otic solution Administer 5 Drops in left ear two (2) times a day.  ibuprofen (MOTRIN) 800 mg tablet Take 1 Tab by mouth every six (6) hours as needed for Pain.  cholecalciferol (VITAMIN D3) 1,000 unit cap Take  by mouth daily.     benzonatate (TESSALON) 200 mg capsule Take 200 mg by mouth as needed for Cough.  calcium carbonate (TUMS) 300 mg (750 mg) chewable tablet Take 1 Tab by mouth daily.  montelukast (SINGULAIR) 10 mg tablet Take 1 Tab by mouth as needed.  DYMISTA 137-50 mcg/spray spry 1 [place] by Both Nostrils route as needed. Up to twice daily     aspirin 81 mg tablet Take 81 mg by mouth daily.  hydroCHLOROthiazide (HYDRODIURIL) 25 mg tablet 1/4 tablet by mouth daily     No current facility-administered medications for this visit.         MD Rosalio Gonzales [facility] [place]  [address] [facility] [address]  [facility], [address]

## 2017-10-23 ENCOUNTER — TELEPHONE (OUTPATIENT)
Dept: NEUROLOGY | Age: 82
End: 2017-10-23

## 2017-10-23 NOTE — TELEPHONE ENCOUNTER
Message from hello: pls cl re: pt will be seeing Dr. Joseph Krabbe as a new pt on 10/24 and wanted to call before and explain observations.

## 2017-10-23 NOTE — TELEPHONE ENCOUNTER
Spoke with patient's son. He stated that her PCP, Dr. Sheba Parker, and he have concerns about dementia. He stated his mother will ask the same questions repeatedly. His siblings have noticed this as well. He asked about Thanksgiving several times. He stated that Thanksgiving is very important to her and he and his siblings are very concerned about her not remembering discussing a significant topic such as Thanksgiving. He asked about blood work and any imaging. Writer advised that if Dr. Martine Bach felt those were needed then she would order them at the visit tomorrow. Will forward this information to Dr. Martine Bach for tomorrow's visit.

## 2017-10-24 ENCOUNTER — OFFICE VISIT (OUTPATIENT)
Dept: NEUROLOGY | Age: 82
End: 2017-10-24

## 2017-10-24 ENCOUNTER — DOCUMENTATION ONLY (OUTPATIENT)
Dept: NEUROLOGY | Age: 82
End: 2017-10-24

## 2017-10-24 VITALS
HEIGHT: 68 IN | OXYGEN SATURATION: 97 % | DIASTOLIC BLOOD PRESSURE: 66 MMHG | RESPIRATION RATE: 18 BRPM | HEART RATE: 62 BPM | BODY MASS INDEX: 17.98 KG/M2 | WEIGHT: 118.6 LBS | SYSTOLIC BLOOD PRESSURE: 122 MMHG

## 2017-10-24 DIAGNOSIS — R41.3 MEMORY LOSS: Primary | ICD-10-CM

## 2017-10-24 RX ORDER — FUROSEMIDE 20 MG/1
TABLET ORAL AS NEEDED
COMMUNITY
Start: 2017-09-12

## 2017-10-24 RX ORDER — CHLORTHALIDONE 25 MG/1
TABLET ORAL DAILY
COMMUNITY
Start: 2017-10-03

## 2017-10-24 NOTE — PROGRESS NOTES
Neurology Consult Note      HISTORY PROVIDED BY: patient and spouse    Chief Complaint:   Chief Complaint   Patient presents with    Memory Loss      Subjective:    Earl Santo is a 81 y.o. right handed female who presents in consultation for memory loss. Pt denies any trouble with her memory. Her  denies noticing any trouble with his wife's memory. Their three children have mentioned concern to them, they feel she is repeating herself. Her  feels like some of the issues noted are related to being \"perfectionist.\"  He does not feel she is having memory loss or forgetfulness that is out of proportion to cohorts. She denies getting lost, no unusual behaviors at home. There is a family h/o dementia in Mom, dec at 81yo and was blind. Her MGM also had dementia. No h/o strokes, no h/o HTN (though this is listed in her chart from Dr. Lisa Baez), no h/o DM. Note from PCP dated 6/6/17 reviewed: Patient and her  attended the visit and she mentioned her children believe she is repeating herself often. Mentions that she still driving does not get lost has not had an accident, balances her own checkbook, manages her finances without air. On exam the patient scored 27/30 on MMSE. Notes she was unable to state the address of their office building. In the assessment and plan there is no mention of a diagnosis of memory loss or workup for this. Labs 9/15/16 CMP unremarkable CBC unremarkable , TSH 1.06.  9/2/15 vitamin D 38.7. Past Medical History:   Diagnosis Date    Arrhythmia     palpitations - no problems. 2/7/17: OCASSSIONALLY & BEING OBSERVED BY DR. Amy Fournier. MITRAL VALVE REGURGITATION R/T RHUMATIC FEVER    H/O cataract     s/p extraction    Hearing loss     Has TM graft    History of poliomyelitis     Though had no symptoms, only tested positive.  Osteopenia     Rheumatic fever     AS A CHILD.        Past Surgical History:   Procedure Laterality Date    HX APPENDECTOMY  27    HX CHOLECYSTECTOMY      HX GI      COLONOSCOPY    HX HEENT      tonsillectomy    HX HEENT      BILATERAL CATARACT.  HX HYSTERECTOMY  1983    with oophorectomy    HX ORTHOPAEDIC      RIGHT KNEE ARTHROSCOPY WITH MINUSCUS REPAIR    HX ORTHOPAEDIC      LEFT KNEE ATHROSCOPY WITH MINUSCUS REPAIR.    HX OTHER SURGICAL  2001    colonoscopy    NEUROLOGICAL PROCEDURE UNLISTED      CERVICAL FUSION      Social History     Social History    Marital status:      Spouse name: N/A    Number of children: N/A    Years of education: N/A     Occupational History          Social History Main Topics    Smoking status: Never Smoker    Smokeless tobacco: Never Used    Alcohol use 4.2 oz/week     7 Glasses of wine per week      Comment: glass of wine or mixed drink - one per night    Drug use: No    Sexual activity: Not on file     Other Topics Concern    Not on file     Social History Narrative    , lives in [place]. With      Family History   Problem Relation Age of Onset    Blindness Mother      Dec 87yo   [facility] Dementia Mother     Heart Attack Father      Dec 44yo    Heart Attack Sister      Dec 81yo    No Known Problems Son     No Known Problems Daughter          Objective:   Review of Systems   Constitutional: Negative. HENT: Positive for hearing loss and tinnitus. Eyes: Negative. Respiratory: Negative. Cardiovascular: Negative. Gastrointestinal: Negative. Genitourinary: Negative. Musculoskeletal: Negative. Skin: Negative. Neurological: Negative. Endo/Heme/Allergies: Negative. Psychiatric/Behavioral: Negative.         Allergies   Allergen Reactions    Latex Rash    Amantadine Other (comments)     \"My mind doesn't think correctly\", depression    Opioids - Morphine Analogues Other (comments)     \"My mind doesn't think correctly\", depression        Meds:  Outpatient Medications Prior to Visit   Medication Sig Dispense Refill    amoxicillin (AMOXIL) 500 mg capsule Take 2,000 mg by mouth. 4 capsules by mouth 1 hour prior to dental procedure      cholecalciferol (VITAMIN D3) 1,000 unit cap Take  by mouth daily.  calcium carbonate (TUMS) 300 mg (750 mg) chewable tablet Take 1 Tab by mouth daily.  montelukast (SINGULAIR) 10 mg tablet Take 1 Tab by mouth as needed.  DYMISTA 137-50 mcg/spray spry 1 [place] by Both Nostrils route as needed. Up to twice daily       aspirin 81 mg tablet Take 81 mg by mouth daily.  TURMERIC ROOT EXTRACT PO Take 1,000 mg by mouth daily.  hydroCHLOROthiazide (HYDRODIURIL) 25 mg tablet 1/4 tablet by mouth daily      ofloxacin (FLOXIN) 0.3 % otic solution Administer 5 Drops in left ear two (2) times a day. 10 mL 3    ibuprofen (MOTRIN) 800 mg tablet Take 1 Tab by mouth every six (6) hours as needed for Pain. 60 Tab 1    benzonatate (TESSALON) 200 mg capsule Take 200 mg by mouth as needed for Cough. No facility-administered medications prior to visit. Imaging:  MRI Results (most recent):  No results found for this or any previous visit. CT Results (most recent):    Results from Hospital Encounter encounter on 01/09/13   CT LOWER EXT LT W CONT   Narrative **Final Report**      ICD Codes / Adm. Diagnosis: 836.1   / Tear of lateral cartilage or m    Examination:  CT LOWER EXT W CON LT  - [id number] - Jan 9 2013 11:48AM  Accession No:  [id number]  Reason:  LMT      REPORT:  INDICATION: LMT evaluate lateral meniscus, lateral knee pain 836.1, 719    COMPARISON: None    EXAM: Following arthrography, axial CT imaging of the left knee is performed   with coronal and sagittal reformations. FINDINGS: There is contrast material demonstrated within the left knee   joint. There are intact appearing cruciate and collateral ligaments. No   meniscal tear is demonstrated. There is a grade 4 chondral defect of the mid   lateral tibial plateau which measures 1.3 cm anterior posterior and 1.5 cm   transverse.  Subtle grade 2 chondral derangement in the lateral femoral   condyle is shown. There is also grade 4 chondral derangement of the medial   patellar facet and intermediate grade derangement of the lateral patellar   facet and trochlear groove. Loose bodies are suspected in the popliteal   tendon sheath and possibly within the medial recess of the suprapatellar   compartment. 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 No   meniscal tear demonstrated. Signing/Reading Doctor: Keyon Rosario ([id number])    Approved: DAHIANA Rosario ([id number])  Jan 9 2013  3:02PM                                     Reviewed records in [facility] and media tab today    Lab Review   Results for orders placed or performed during the hospital encounter of 02/07/17   CBC WITH AUTOMATED DIFF   Result Value Ref Range    WBC 6.4 3.6 - 11.0 K/uL    RBC 4.09 3.80 - 5.20 M/uL    HGB 12.6 11.5 - 16.0 g/dL    HCT 38.6 35.0 - 47.0 %    MCV 94.4 80.0 - 99.0 FL    MCH 30.8 26.0 - 34.0 PG    MCHC 32.6 30.0 - 36.5 g/dL    RDW 13.1 11.5 - 14.5 %    PLATELET 873 347 - 139 K/uL    NEUTROPHILS 64 32 - 75 %    LYMPHOCYTES 24 12 - 49 %    MONOCYTES 10 5 - 13 %    EOSINOPHILS 2 0 - 7 %    BASOPHILS 0 0 - 1 %    ABS. NEUTROPHILS 4.1 1.8 - 8.0 K/UL    ABS. LYMPHOCYTES 1.6 0.8 - 3.5 K/UL    ABS. MONOCYTES 0.7 0.0 - 1.0 K/UL    ABS. EOSINOPHILS 0.1 0.0 - 0.4 K/UL    ABS. BASOPHILS 0.0 0.0 - 0.1 K/UL   METABOLIC PANEL, COMPREHENSIVE   Result Value Ref Range    Sodium 138 136 - 145 mmol/L    Potassium 3.8 3.5 - 5.1 mmol/L    Chloride 101 97 - 108 mmol/L    CO2 29 21 - 32 mmol/L    Anion gap 8 5 - 15 mmol/L    Glucose 83 65 - 100 mg/dL    BUN 18 6 - 20 MG/DL    Creatinine 0.64 0.55 - 1.02 MG/DL    BUN/Creatinine ratio 28 (H) 12 - 20      GFR est AA >60 >60 ml/min/1.73m2    GFR est non-AA >60 >60 ml/min/1.73m2    Calcium 9.6 8.5 - 10.1 MG/DL    Bilirubin, total 0.4 0.2 - 1.0 MG/DL    ALT (SGPT) 27 12 - 78 U/L    AST (SGOT) 24 15 - 37 U/L    Alk. phosphatase 62 45 - 117 U/L    Protein, total 6.4 6.4 - 8.2 g/dL    Albumin 4.0 3.5 - 5.0 g/dL    Globulin 2.4 2.0 - 4.0 g/dL    A-G Ratio 1.7 1.1 - 2.2          Exam:  Visit Vitals    /66    Pulse 62    Resp 18    Ht 5' 8\" (1.727 m)    Wt 53.8 kg (118 lb 9.6 oz)    SpO2 97%    BMI 18.03 kg/m2     General:  Alert, cooperative, no distress. Head:  Normocephalic, without obvious abnormality, atraumatic. Respiratory:  Heart:   Non labored breathing  Regular rate and rhythm, no murmurs   Neck:   2+ carotids, no bruits   Extremities: Warm, no cyanosis or edema. Pulses: 2+ radial pulses. Neurologic:  MS: Alert, speech intact. Language -see MMSE. Attention and fund of knowledge appropriate.     Cranial Nerves:  II: visual fields Full to confrontation   II: pupils Equal, round, reactive to light   II: optic disc    III,VII: ptosis none   III,IV,VI: extraocular muscles  EOMI, no nystagmus or diplopia   V: facial light touch sensation  normal   VII: facial muscle function   symmetric   VIII: hearing intact   IX: soft palate elevation  normal   XI: trapezius strength  5/5   XI: sternocleidomastoid strength 5/5   XII: tongue  Midline     Motor: normal bulk and tone, no tremor              Strength: 5/5 throughout, no PD  Sensory: intact to LT, PP  Coordination: FTN and HTS intact, CODIE intact  Gait: normal gait with normal arm swing and turning  Reflexes: 2+ symmetric, toes downgoing    Mini Mental State Exam 10/24/2017   What is the Year 1   What is the Season 1   What is the Date 0   What is the Day 0   What is the Month 1   Where are we State 1   Where are we Country 1   Where are we [place] [facility] 1   Whree are we Floor 1   Name three objects, then ask the patient to say them 3   Serial sevens Subtract 7 from 100 in increments 5   Ask for the three objects repeated above 2   Name a pencil 1   Name a watch 1   Have the patient repeat this phrase \"No ifs, ands, or buts\" 1   Three stage command: Take the paper in your right hand 1   Fold the paper in half 1   Put the paper on the floor 1   Read and obey the following: CLOSE YOUR EYES 1   Have the patient write a sentence 1   Have the patient copy a figure 1   Mini Mental Score 27   Some recent data might be hidden            Assessment/Plan   Pt is an 81 y.o. right handed female who reports that her three children, not present today, are concerned about her memory due to repeating herself. Her  has no concerns. She does have family h/o dementia in her mother and MGM. Exam is non-focal and unremarkable, scores 27/30 on MMSE missing date and day by one and unable to recall 1/3 at delayed recall, able to recall with prompting. I have low suspicion for dementia at this time. We discussed the possibility of an early or mild dementia and discussed options of returning for follow-up in 6 months to reassess versus proceeding with neuropsychological testing. Patient reports that for her own peace of mind she would prefer to go ahead and pursue neuropsychological testing now, and notes if she did have dementia would like to start treatment as soon as possible. Referral to neuropsychology. Follow-up in clinic after testing completed. ICD-10-CM ICD-9-CM    1. Memory loss R41.3 780.93 REFERRAL TO NEUROPSYCHOLOGY       Signed:   Michelle Herrera MD  10/24/2017

## 2017-10-24 NOTE — PATIENT INSTRUCTIONS
[address] [facility]   Statement to Patients  April 1, 2014      In an effort to ensure the large volume of patient prescription refills is processed in the most efficient and expeditious manner, we are asking our patients to assist us by calling your Pharmacy for all prescription refills, this will include also your  Mail Order Pharmacy. The pharmacy will contact our office electronically to continue the refill process. Please do not wait until the last minute to call your pharmacy. We need at least 48 hours (2days) to fill prescriptions. We also encourage you to call your pharmacy before going to  your prescription to make sure it is ready. With regard to controlled substance prescription refill requests (narcotic refills) that need to be picked up at our office, we ask your cooperation by providing us with at least 72 hours (3days) notice that you will need a refill. We will not refill narcotic prescription refill requests after 4:00pm on any weekday, Monday through Thursday, or after 2:00pm on Fridays, or on the weekends. We encourage everyone to explore another way of getting your prescription refill request processed using [employer], our patient web portal through our electronic medical record system. [employer] is an efficient and effective way to communicate your medication request directly to the office and  downloadable as an epi on your smart phone . [employer] also features a review functionality that allows you to view your medication list as well as leave messages for your physician. Are you ready to get connected? If so please review the attatched instructions or speak to any of our staff to get you set up right away! Thank you so much for your cooperation. Should you have any questions please contact our Practice Administrator.     The Physicians and Staff,  [facility]         Please bring all medication bottles, including vitamins, supplements and any over-the-counter medications, to your next office visit.

## 2017-10-24 NOTE — MR AVS SNAPSHOT
Visit Information Date & Time Provider Department Dept. Phone Encounter #  
 10/24/2017  8:00 AM Julieth Chong MD [facility] at [address] [id number] Follow-up Instructions Return in about 4 months (around 2/24/2018). Upcoming Health Maintenance Date Due DTaP/Tdap/Td series (1 - Tdap) 9/10/1957 ZOSTER VACCINE AGE 60> 7/10/1996 GLAUCOMA SCREENING Q2Y 9/10/2001 OSTEOPOROSIS SCREENING (DEXA) 9/10/2001 Pneumococcal 65+ Low/Medium Risk (1 of 2 - PCV13) 9/10/2001 MEDICARE YEARLY EXAM 9/10/2001 INFLUENZA AGE 9 TO ADULT 8/1/2017 Allergies as of 10/24/2017  Review Complete On: 10/24/2017 By: Julieth Chong MD  
  
 Severity Noted Reaction Type Reactions Latex  10/03/2011    Rash Amantadine  12/02/2016    Other (comments) \"My mind doesn't think correctly\", depression Opioids - Morphine Analogues  12/02/2016    Other (comments) \"My mind doesn't think correctly\", depression Current Immunizations  Never Reviewed No immunizations on file. Not reviewed this visit You Were Diagnosed With   
  
 Codes Comments Memory loss    -  Primary ICD-10-CM: R41.3 ICD-9-CM: 780.93 Vitals BP Pulse Resp Height(growth percentile) Weight(growth percentile) SpO2  
 122/66 62 18 5' 8\" (1.727 m) 118 lb 9.6 oz (53.8 kg) 97% BMI OB Status Smoking Status 18.03 kg/m2 Hysterectomy Never Smoker Vitals History BMI and BSA Data Body Mass Index Body Surface Area 18.03 kg/m 2 1.61 m 2 Your Updated Medication List  
  
   
This list is accurate as of: 10/24/17  8:49 AM.  Always use your most recent med list.  
  
  
  
  
 amoxicillin 500 mg capsule Commonly known as:  AMOXIL Take 2,000 mg by mouth. 4 capsules by mouth 1 hour prior to dental procedure  
  
 aspirin 81 mg tablet Take 81 mg by mouth daily. benzonatate 200 mg capsule Commonly known as:  TESSALON  
 Take 200 mg by mouth as needed for Cough. chlorthalidone 25 mg tablet Commonly known as:  Britta Pollen 137-50 mcg/spray Spry Generic drug:  azelastine-fluticasone 1 [place] by Both Nostrils route as needed. Up to twice daily  
  
 furosemide 20 mg tablet Commonly known as:  LASIX  
  
 hydroCHLOROthiazide 25 mg tablet Commonly known as:  HYDRODIURIL  
1/4 tablet by mouth daily  
  
 ibuprofen 800 mg tablet Commonly known as:  MOTRIN Take 1 Tab by mouth every six (6) hours as needed for Pain.  
  
 montelukast 10 mg tablet Commonly known as:  SINGULAIR Take 1 Tab by mouth as needed. ofloxacin 0.3 % otic solution Commonly known as:  FLOXIN Administer 5 Drops in left ear two (2) times a day. TUMS 300 mg (750 mg) chewable tablet Generic drug:  calcium carbonate Take 1 Tab by mouth daily. TURMERIC ROOT EXTRACT PO Take 1,000 mg by mouth daily. VITAMIN D3 1,000 unit Cap Generic drug:  cholecalciferol Take  by mouth daily. We Performed the Following REFERRAL TO NEUROPSYCHOLOGY [THQ99 Custom] Comments:  
 Please evaluate for evidence of dementia. .  
  
Follow-up Instructions Return in about 4 months (around 2/24/2018). Referral Information Referral ID Referred By Referred To  
  
 [id number] Nicky TAVERA Not Available Visits Status Start Date End Date 1 New Request 10/24/17 10/24/18 If your referral has a status of pending review or denied, additional information will be sent to support the outcome of this decision. Patient Instructions PRESCRIPTION REFILL POLICY [facility] Statement to Patients April 1, 2014 In an effort to ensure the large volume of patient prescription refills is processed in the most efficient and expeditious manner, we are asking our patients to assist us by calling your Pharmacy for all prescription refills, this will include also your  Mail Order Pharmacy. The pharmacy will contact our office electronically to continue the refill process. Please do not wait until the last minute to call your pharmacy. We need at least 48 hours (2days) to fill prescriptions. We also encourage you to call your pharmacy before going to  your prescription to make sure it is ready. With regard to controlled substance prescription refill requests (narcotic refills) that need to be picked up at our office, we ask your cooperation by providing us with at least 72 hours (3days) notice that you will need a refill. We will not refill narcotic prescription refill requests after 4:00pm on any weekday, Monday through Thursday, or after 2:00pm on Fridays, or on the weekends. We encourage everyone to explore another way of getting your prescription refill request processed using [employer], our patient web portal through our electronic medical record system. [employer] is an efficient and effective way to communicate your medication request directly to the office and  downloadable as an epi on your smart phone . [employer] also features a review functionality that allows you to view your medication list as well as leave messages for your physician. Are you ready to get connected? If so please review the attatched instructions or speak to any of our staff to get you set up right away! Thank you so much for your cooperation. Should you have any questions please contact our Practice Administrator. The Physicians and Staff,  [facility] Please bring all medication bottles, including vitamins, supplements and any over-the-counter medications, to your next office visit. Introducing [facility] & HEALTH SERVICES! Dear Ciaran Mirza: Thank you for requesting a [employer] account. Our records indicate that you already have an active [employer] account.   You can access your account anytime at [email or web address] Did you know that you can access your hospital and ER discharge instructions at any time in [employer]? You can also review all of your test results from your hospital stay or ER visit. Additional Information If you have questions, please visit the Frequently Asked Questions section of the [employer] website at [email or web address] Remember, [employer] is NOT to be used for urgent needs. For medical emergencies, dial 911. Now available from your iPhone and Android! Please provide this summary of care documentation to your next provider. Your primary care clinician is listed as Jenise Floyd. If you have any questions after today's visit, please call [phone].

## 2017-10-25 ENCOUNTER — TELEPHONE (OUTPATIENT)
Dept: NEUROLOGY | Age: 82
End: 2017-10-25

## 2017-10-25 NOTE — TELEPHONE ENCOUNTER
Spoke with patient. She stated she forgot to tell Dr. Cele Barfield that the soles of her feet \"burn at times\". She stated the burning sensation occurs at random. She reported she walks about 45 minutes every day but doesn't notice if it occurs after walking. Advised would forward this to Dr. Cele Barfield and will call back with any recommendations. Patient was given an opportunity to ask questions, repeated information, and verbalized understanding.

## 2017-10-25 NOTE — TELEPHONE ENCOUNTER
Yasemin - Please call pt: If this is very bothersome to her or is worsening, then we can make a f/u appt to evaluate this, if not we can discuss at her f/u appt. If she is ok with waiting, she can always call for earlier appt if she notices a significant change in her sxs prior to f/u.

## 2017-10-26 NOTE — TELEPHONE ENCOUNTER
Spoke with patient and patient's  and informed them of Dr. Tim Chong recommendations. Patient was scheduled for follow up on 11/24/17 at 2:20 pm. Patient was given an opportunity to ask questions, repeated information, and verbalized understanding.

## 2017-11-17 ENCOUNTER — OFFICE VISIT (OUTPATIENT)
Dept: NEUROLOGY | Age: 82
End: 2017-11-17

## 2017-11-17 VITALS
HEART RATE: 66 BPM | WEIGHT: 119 LBS | OXYGEN SATURATION: 98 % | DIASTOLIC BLOOD PRESSURE: 54 MMHG | RESPIRATION RATE: 18 BRPM | BODY MASS INDEX: 18.04 KG/M2 | SYSTOLIC BLOOD PRESSURE: 122 MMHG | HEIGHT: 68 IN

## 2017-11-17 DIAGNOSIS — G62.9 NEUROPATHY: Primary | ICD-10-CM

## 2017-11-17 DIAGNOSIS — R68.89 OTHER GENERAL SYMPTOMS AND SIGNS: ICD-10-CM

## 2017-11-17 DIAGNOSIS — Z86.39 PERSONAL HISTORY OF OTHER ENDOCRINE, NUTRITIONAL AND METABOLIC DISEASE: ICD-10-CM

## 2017-11-17 NOTE — MR AVS SNAPSHOT
Visit Information Date & Time Provider Department Dept. Phone Encounter #  
 11/17/2017  9:00 AM MD Clarke Garsor [facility] at [address] [id number] Your Appointments 2/27/2018  8:40 AM  
Follow Up with MD Kwan [facility] at [facility] [address]) Appt Note: 4 month f/u memory loss NN 10/24/17  
 [address] [place] [address]  
[phone]  
  
   
 [address] [address] Upcoming Health Maintenance Date Due DTaP/Tdap/Td series (1 - Tdap) 9/10/1956 ZOSTER VACCINE AGE 60> 7/10/1996 GLAUCOMA SCREENING Q2Y 9/10/2001 OSTEOPOROSIS SCREENING (DEXA) 9/10/2001 Pneumococcal 65+ Low/Medium Risk (1 of 2 - PCV13) 9/10/2001 MEDICARE YEARLY EXAM 9/10/2001 Influenza Age 5 to Adult 8/1/2017 Allergies as of 11/17/2017  Review Complete On: 11/17/2017 By: Sandoval Asif LPN Severity Noted Reaction Type Reactions Latex  10/03/2011    Rash Amantadine  12/02/2016    Other (comments) \"My mind doesn't think correctly\", depression Opioids - Morphine Analogues  12/02/2016    Other (comments) \"My mind doesn't think correctly\", depression Current Immunizations  Never Reviewed No immunizations on file. Not reviewed this visit You Were Diagnosed With   
  
 Codes Comments Neuropathy    -  Primary ICD-10-CM: G62.9 ICD-9-CM: 355.9 Other general symptoms and signs     ICD-10-CM: R68.89 ICD-9-CM: 780.99 Personal history of other endocrine, nutritional and metabolic disease     [facility]-DD: Z86.39 
ICD-9-CM: V12.29 Vitals BP Pulse Resp Height(growth percentile) Weight(growth percentile) SpO2  
 122/54 66 18 5' 8\" (1.727 m) 119 lb (54 kg) 98% BMI OB Status Smoking Status 18.09 kg/m2 Hysterectomy Never Smoker Vitals History BMI and BSA Data Body Mass Index Body Surface Area 18.09 kg/m 2 1.61 m 2 Your Updated Medication List  
  
   
This list is accurate as of: 11/17/17  9:48 AM.  Always use your most recent med list.  
  
  
  
  
 amoxicillin 500 mg capsule Commonly known as:  AMOXIL Take 2,000 mg by mouth. 4 capsules by mouth 1 hour prior to dental procedure  
  
 aspirin 81 mg tablet Take 81 mg by mouth daily. benzonatate 200 mg capsule Commonly known as:  TESSALON Take 200 mg by mouth as needed for Cough. chlorthalidone 25 mg tablet Commonly known as:  [place] Pack 137-50 mcg/spray Spry Generic drug:  azelastine-fluticasone 1 [place] by Both Nostrils route as needed. Up to twice daily  
  
 furosemide 20 mg tablet Commonly known as:  LASIX  
as needed. hydroCHLOROthiazide 25 mg tablet Commonly known as:  HYDRODIURIL  
1/4 tablet by mouth daily  
  
 ibuprofen 800 mg tablet Commonly known as:  MOTRIN Take 1 Tab by mouth every six (6) hours as needed for Pain.  
  
 montelukast 10 mg tablet Commonly known as:  SINGULAIR Take 1 Tab by mouth as needed. ofloxacin 0.3 % otic solution Commonly known as:  FLOXIN Administer 5 Drops in left ear two (2) times a day. TUMS 300 mg (750 mg) chewable tablet Generic drug:  calcium carbonate Take 1 Tab by mouth daily. TURMERIC ROOT EXTRACT PO Take 1,000 mg by mouth daily. VITAMIN D3 1,000 unit Cap Generic drug:  cholecalciferol Take  by mouth daily. We Performed the Following COPPER V3745817 CPT(R)] HEMOGLOBIN A1C WITH EAG [36267 CPT(R)] METHYLMALONIC ACID [27488 CPT(R)] PROTEIN ELECTROPHORESIS + BAL, UR, 24HR [id number] CPT(R)] RPR [12825 CPT(R)] SPEP AND BAL, SERUM [FQL75623 Custom] VITAMIN B12 [id number] CPT(R)] VITAMIN E [id number] CPT(R)] To-Do List   
 11/28/2017 Neurology:  EMG NCV MOTOR WITH F/WAVE PER NERVE Patient Instructions PRESCRIPTION REFILL POLICY [facility] Statement to Patients April 1, 2014 In an effort to ensure the large volume of patient prescription refills is processed in the most efficient and expeditious manner, we are asking our patients to assist us by calling your Pharmacy for all prescription refills, this will include also your  Mail Order Pharmacy. The pharmacy will contact our office electronically to continue the refill process. Please do not wait until the last minute to call your pharmacy. We need at least 48 hours (2days) to fill prescriptions. We also encourage you to call your pharmacy before going to  your prescription to make sure it is ready. With regard to controlled substance prescription refill requests (narcotic refills) that need to be picked up at our office, we ask your cooperation by providing us with at least 72 hours (3days) notice that you will need a refill. We will not refill narcotic prescription refill requests after 4:00pm on any weekday, Monday through Thursday, or after 2:00pm on Fridays, or on the weekends. We encourage everyone to explore another way of getting your prescription refill request processed using [employer], our patient web portal through our electronic medical record system. [employer] is an efficient and effective way to communicate your medication request directly to the office and  downloadable as an epi on your smart phone . [employer] also features a review functionality that allows you to view your medication list as well as leave messages for your physician. Are you ready to get connected? If so please review the attatched instructions or speak to any of our staff to get you set up right away! Thank you so much for your cooperation. Should you have any questions please contact our Practice Administrator. The Physicians and Staff,  [facility] Please bring all medication bottles, including vitamins, supplements and any over-the-counter medications, to your next office visit. Introducing [facility] & HEALTH SERVICES! Dear Juana Bradford: Thank you for requesting a [employer] account. Our records indicate that you already have an active [employer] account. You can access your account anytime at [email or web address] Did you know that you can access your hospital and ER discharge instructions at any time in [employer]? You can also review all of your test results from your hospital stay or ER visit. Additional Information If you have questions, please visit the Frequently Asked Questions section of the [employer] website at [email or web address] Remember, [employer] is NOT to be used for urgent needs. For medical emergencies, dial 911. Now available from your iPhone and Android! Please provide this summary of care documentation to your next provider. Your primary care clinician is listed as Jenise Floyd. If you have any questions after today's visit, please call [phone].

## 2017-11-17 NOTE — PATIENT INSTRUCTIONS
[facility]   Statement to Patients  April 1, 2014      In an effort to ensure the large volume of patient prescription refills is processed in the most efficient and expeditious manner, we are asking our patients to assist us by calling your Pharmacy for all prescription refills, this will include also your  Mail Order Pharmacy. The pharmacy will contact our office electronically to continue the refill process. Please do not wait until the last minute to call your pharmacy. We need at least 48 hours (2days) to fill prescriptions. We also encourage you to call your pharmacy before going to  your prescription to make sure it is ready. With regard to controlled substance prescription refill requests (narcotic refills) that need to be picked up at our office, we ask your cooperation by providing us with at least 72 hours (3days) notice that you will need a refill. We will not refill narcotic prescription refill requests after 4:00pm on any weekday, Monday through Thursday, or after 2:00pm on Fridays, or on the weekends. We encourage everyone to explore another way of getting your prescription refill request processed using [employer], our patient web portal through our electronic medical record system. [employer] is an efficient and effective way to communicate your medication request directly to the office and  downloadable as an epi on your smart phone . [employer] also features a review functionality that allows you to view your medication list as well as leave messages for your physician. Are you ready to get connected? If so please review the attatched instructions or speak to any of our staff to get you set up right away! Thank you so much for your cooperation. Should you have any questions please contact our Practice Administrator.     The Physicians and Staff,  [facility]             Please bring all medication bottles, including vitamins, supplements and any over-the-counter medications, to your next office visit.

## 2017-11-17 NOTE — PROGRESS NOTES
Neurology Consult Note      HISTORY PROVIDED BY: patient and spouse    Chief Complaint:   Chief Complaint   Patient presents with    Foot Problem     burning sensation in feet      Subjective:   Pt is an 81 y.o. right handed female initially and last seen in clinic on 10/24/17 after her children, not present at the visit, complained about her memory due to repeating herself. Her , present at the visit, had no concerns. She does have family h/o dementia in her mother and MGM. Exam was non-focal and unremarkable, scored 27/30 on MMSE missing date and day by one and unable to recall 1/3 at delayed recall, able to recall with prompting. I had low suspicion for dementia, but discussed the possibility of an early or mild dementia and options of returning for follow-up in 6 months to reassess versus proceeding with neuropsychological testing. Patient wanted peace of mind and wanted to pursue neuropsychological testing. She returns for earlier than planned f/u. She called the clinic on 10/25/17 reporting that she forgot to tell me that the soles of her feet \"burn at times\". She stated the burning sensation occurs at random. She reported she walks about 45 minutes every day but doesn't notice if it occurs after walking. She c/o burning in her feet going up to mid-leg, started about 6-8 months ago. Denies numbness. No weakness, no difficulty walking. Burning is becoming anoying, but not inhibiting activity, not keeping her awake, may hurt more at night. No h/o DM. Drinks one glass of alcohol a night. Past Medical History:   Diagnosis Date    Arrhythmia     palpitations - no problems. 2/7/17: OCASSSIONALLY & BEING OBSERVED BY DR. Amy Fournier. MITRAL VALVE REGURGITATION R/T RHUMATIC FEVER    H/O cataract     s/p extraction    Hearing loss     Has TM graft    History of poliomyelitis     Though had no symptoms, only tested positive.  Osteopenia     Rheumatic fever     AS A CHILD.        Past Surgical History: Procedure Laterality Date    HX APPENDECTOMY  1983    HX CHOLECYSTECTOMY      HX GI      COLONOSCOPY    HX HEENT      tonsillectomy    HX HEENT      BILATERAL CATARACT.  HX HYSTERECTOMY  1983    with oophorectomy    HX ORTHOPAEDIC      RIGHT KNEE ARTHROSCOPY WITH MINUSCUS REPAIR    HX ORTHOPAEDIC      LEFT KNEE ATHROSCOPY WITH MINUSCUS REPAIR.    HX OTHER SURGICAL  2001    colonoscopy    NEUROLOGICAL PROCEDURE UNLISTED      CERVICAL FUSION      Social History     Social History    Marital status:      Spouse name: N/A    Number of children: N/A    Years of education: N/A     Occupational History          Social History Main Topics    Smoking status: Never Smoker    Smokeless tobacco: Never Used    Alcohol use 4.2 oz/week     7 Glasses of wine per week      Comment: glass of wine or mixed drink - one per night    Drug use: No    Sexual activity: Not on file     Other Topics Concern    Not on file     Social History Narrative    , lives in [place]. With      Family History   Problem Relation Age of Onset    Blindness Mother      Dec 89yo   [facility] Dementia Mother     Heart Attack Father      Dec 44yo    Heart Attack Sister      Dec 83yo    No Known Problems Son     No Known Problems Daughter          Objective:   Review of Systems : Per HPI, o/w neg      Allergies   Allergen Reactions    Latex Rash    Amantadine Other (comments)     \"My mind doesn't think correctly\", depression    Opioids - Morphine Analogues Other (comments)     \"My mind doesn't think correctly\", depression        Meds:  Outpatient Medications Prior to Visit   Medication Sig Dispense Refill    furosemide (LASIX) 20 mg tablet as needed.  chlorthalidone (HYGROTEN) 25 mg tablet       TURMERIC ROOT EXTRACT PO Take 1,000 mg by mouth daily.  hydroCHLOROthiazide (HYDRODIURIL) 25 mg tablet 1/4 tablet by mouth daily      amoxicillin (AMOXIL) 500 mg capsule Take 2,000 mg by mouth.  4 capsules by mouth 1 hour prior to dental procedure      ibuprofen (MOTRIN) 800 mg tablet Take 1 Tab by mouth every six (6) hours as needed for Pain. 60 Tab 1    cholecalciferol (VITAMIN D3) 1,000 unit cap Take  by mouth daily.  benzonatate (TESSALON) 200 mg capsule Take 200 mg by mouth as needed for Cough.  calcium carbonate (TUMS) 300 mg (750 mg) chewable tablet Take 1 Tab by mouth daily.  montelukast (SINGULAIR) 10 mg tablet Take 1 Tab by mouth as needed.  DYMISTA 137-50 mcg/spray spry 1 [place] by Both Nostrils route as needed. Up to twice daily       aspirin 81 mg tablet Take 81 mg by mouth daily.  ofloxacin (FLOXIN) 0.3 % otic solution Administer 5 Drops in left ear two (2) times a day. 10 mL 3     No facility-administered medications prior to visit. Imaging:  MRI Results (most recent):  No results found for this or any previous visit. CT Results (most recent):    Results from Hospital Encounter encounter on 01/09/13   CT LOWER EXT LT W CONT   Narrative **Final Report**      ICD Codes / Adm. Diagnosis: 836.1   / Tear of lateral cartilage or m    Examination:  CT LOWER EXT W CON LT  - [id number] - Jan 9 2013 11:48AM  Accession No:  [id number]  Reason:  LMT      REPORT:  INDICATION: LMT evaluate lateral meniscus, lateral knee pain 836.1, 719    COMPARISON: None    EXAM: Following arthrography, axial CT imaging of the left knee is performed   with coronal and sagittal reformations. FINDINGS: There is contrast material demonstrated within the left knee   joint. There are intact appearing cruciate and collateral ligaments. No   meniscal tear is demonstrated. There is a grade 4 chondral defect of the mid   lateral tibial plateau which measures 1.3 cm anterior posterior and 1.5 cm   transverse. Subtle grade 2 chondral derangement in the lateral femoral   condyle is shown.  There is also grade 4 chondral derangement of the medial   patellar facet and intermediate grade derangement of the lateral patellar   facet and trochlear groove. Loose bodies are suspected in the popliteal   tendon sheath and possibly within the medial recess of the suprapatellar   compartment. 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 No   meniscal tear demonstrated. Signing/Reading Doctor: Columba Johnsing ([id number])    Approved: DAHIANA Johnsing ([id number])  Jan 9 2013  3:02PM                                     Reviewed records in [facility] and media tab today    Lab Review   Results for orders placed or performed during the hospital encounter of 02/07/17   CBC WITH AUTOMATED DIFF   Result Value Ref Range    WBC 6.4 3.6 - 11.0 K/uL    RBC 4.09 3.80 - 5.20 M/uL    HGB 12.6 11.5 - 16.0 g/dL    HCT 38.6 35.0 - 47.0 %    MCV 94.4 80.0 - 99.0 FL    MCH 30.8 26.0 - 34.0 PG    MCHC 32.6 30.0 - 36.5 g/dL    RDW 13.1 11.5 - 14.5 %    PLATELET 842 223 - 679 K/uL    NEUTROPHILS 64 32 - 75 %    LYMPHOCYTES 24 12 - 49 %    MONOCYTES 10 5 - 13 %    EOSINOPHILS 2 0 - 7 %    BASOPHILS 0 0 - 1 %    ABS. NEUTROPHILS 4.1 1.8 - 8.0 K/UL    ABS. LYMPHOCYTES 1.6 0.8 - 3.5 K/UL    ABS. MONOCYTES 0.7 0.0 - 1.0 K/UL    ABS. EOSINOPHILS 0.1 0.0 - 0.4 K/UL    ABS. BASOPHILS 0.0 0.0 - 0.1 K/UL   METABOLIC PANEL, COMPREHENSIVE   Result Value Ref Range    Sodium 138 136 - 145 mmol/L    Potassium 3.8 3.5 - 5.1 mmol/L    Chloride 101 97 - 108 mmol/L    CO2 29 21 - 32 mmol/L    Anion gap 8 5 - 15 mmol/L    Glucose 83 65 - 100 mg/dL    BUN 18 6 - 20 MG/DL    Creatinine 0.64 0.55 - 1.02 MG/DL    BUN/Creatinine ratio 28 (H) 12 - 20      GFR est AA >60 >60 ml/min/1.73m2    GFR est non-AA >60 >60 ml/min/1.73m2    Calcium 9.6 8.5 - 10.1 MG/DL    Bilirubin, total 0.4 0.2 - 1.0 MG/DL    ALT (SGPT) 27 12 - 78 U/L    AST (SGOT) 24 15 - 37 U/L    Alk.  phosphatase 62 45 - 117 U/L    Protein, total 6.4 6.4 - 8.2 g/dL    Albumin 4.0 3.5 - 5.0 g/dL    Globulin 2.4 2.0 - 4.0 g/dL    A-G Ratio 1.7 1.1 - 2.2          Exam:  Visit Vitals  /54    Pulse 66    Resp 18    Ht 5' 8\" (1.727 m)    Wt 54 kg (119 lb)    SpO2 98%    BMI 18.09 kg/m2     General:  Alert, cooperative, no distress. Head:  Normocephalic, without obvious abnormality, atraumatic. Respiratory:  Heart:   Non labored breathing  Regular rate and rhythm, no murmurs   Neck:      Extremities: Warm, no cyanosis or edema. Pulses: 2+ radial pulses. Neurologic:  MS: Alert, speech intact. Language -intact. Attention and fund of knowledge appropriate. Cranial Nerves:  II: visual fields    II: pupils    II: optic disc    III,VII: ptosis none   III,IV,VI: extraocular muscles  EOMI, no nystagmus    V: facial light touch sensation     VII: facial muscle function   symmetric   VIII: hearing intact   IX: soft palate elevation     XI: trapezius strength     XI: sternocleidomastoid strength    XII: tongue       Motor: normal bulk and tone, no tremor              Strength: 5/5 throughout, no PD  Sensory: intact to LT, PP no prox to distal gradient, markedly diminished vibratory sensation, mod dec position sense on left, intact on right.    Coordination: HTS with dysmetria bilaterally, romberg neg  Gait: normal gait , able to tandem walk  Reflexes: 2+ symmetric, toes mute    Mini Mental State Exam 10/24/2017   What is the Year 1   What is the Season 1   What is the Date 0   What is the Day 0   What is the Month 1   Where are we State 1   Where are we Country 1   Where are we [place] or [facility] 1   [place] are we Floor 1   Name three objects, then ask the patient to say them 3   Serial sevens Subtract 7 from 100 in increments 5   Ask for the three objects repeated above 2   Name a pencil 1   Name a watch 1   Have the patient repeat this phrase \"No ifs, ands, or buts\" 1   Three stage command: Take the paper in your right hand 1   Fold the paper in half 1   Put the paper on the floor 1   Read and obey the following: CLOSE YOUR EYES 1   Have the patient write a sentence 1   Have the patient copy a figure 1   Mini Mental Score 27   Some recent data might be hidden            Assessment/Plan   Pt is an 81 y.o. right handed female initially evaluated for memory loss, now with new c/o burning in her feet to mid-leg for the last 6-8 months, no numbness or weakness. Exam with markedly diminished vibratory sensation, mod dec position sense on left, intact on right, and dysmetria with HTS bilaterally, o/w non-focal and unremarkable (scored 27/30 on MMSE at 10/24/17 visit.)  Possible early or mild peripheral neuropathy without obvious etiology at this time. Recommend NCS/EMG to assess for PN and labs for underlying etiology ordered - B12/MMA, Vit E, Copper, RPR, SPEP with IF, UPEP, HgbA1C. F/u as planned after neuropsychological testing completed. ICD-10-CM ICD-9-CM    1. Neuropathy G62.9 355.9 EMG NCV MOTOR WITH F/WAVE PER NERVE      VITAMIN B12      METHYLMALONIC ACID      VITAMIN E      COPPER      RPR      SPEP AND BAL, SERUM      PROTEIN ELECTROPHORESIS + BAL, UR, 24HR      HEMOGLOBIN A1C WITH EAG   2. Other general symptoms and signs  R68.89 780.99 VITAMIN B12   3. Personal history of other endocrine, nutritional and metabolic disease  L44.61 X24.41 HEMOGLOBIN A1C WITH EAG       Signed:   Jani Will MD  11/17/2017

## 2017-11-23 LAB
A-TOCOPHEROL VIT E SERPL-MCNC: 17.6 MG/L (ref 6.5–21.5)
ALBUMIN SERPL ELPH-MCNC: 4.1 G/DL (ref 2.9–4.4)
ALBUMIN/GLOB SERPL: 1.5 {RATIO} (ref 0.7–1.7)
ALPHA1 GLOB SERPL ELPH-MCNC: 0.3 G/DL (ref 0–0.4)
ALPHA2 GLOB SERPL ELPH-MCNC: 0.8 G/DL (ref 0.4–1)
B-GLOBULIN SERPL ELPH-MCNC: 1.1 G/DL (ref 0.7–1.3)
COPPER SERPL-MCNC: 126 UG/DL (ref 72–166)
EST. AVERAGE GLUCOSE BLD GHB EST-MCNC: 108 MG/DL
GAMMA GLOB SERPL ELPH-MCNC: 0.6 G/DL (ref 0.4–1.8)
GLOBULIN SER-MCNC: 2.8 G/DL (ref 2.2–3.9)
HBA1C MFR BLD: 5.4 % (ref 4.8–5.6)
IGA SERPL-MCNC: 106 MG/DL (ref 64–422)
IGG SERPL-MCNC: 668 MG/DL (ref 700–1600)
IGM SERPL-MCNC: 70 MG/DL (ref 26–217)
INTERPRETATION SERPL IEP-IMP: ABNORMAL
M PROTEIN SERPL ELPH-MCNC: ABNORMAL G/DL
METHYLMALONATE SERPL-SCNC: 291 NMOL/L (ref 0–378)
PLEASE NOTE:, 149534: ABNORMAL
PROT SERPL-MCNC: 6.9 G/DL (ref 6–8.5)
RPR SER QL: NON REACTIVE
VIT B12 SERPL-MCNC: 177 PG/ML (ref 211–946)

## 2017-11-27 ENCOUNTER — TELEPHONE (OUTPATIENT)
Dept: NEUROLOGY | Age: 82
End: 2017-11-27

## 2017-11-27 NOTE — PROGRESS NOTES
Please call pt: All of her labs look good except Vit B12 level is low. Please have her talk to her PCP about B12 replacement.   Please forward lab results to PCP

## 2017-11-27 NOTE — PROGRESS NOTES
Call to patient. She stated she was at a meeting and could not talk. Writer advised patient to call back when she was available to speak. She verbalized understanding.

## 2017-11-27 NOTE — TELEPHONE ENCOUNTER
----- Message from Raisa Soto MD sent at 11/27/2017  7:20 AM EST -----  Please call pt: All of her labs look good except Vit B12 level is low. Please have her talk to her PCP about B12 replacement.   Please forward lab results to PCP

## 2017-12-05 NOTE — TELEPHONE ENCOUNTER
Spoke with Maria Onofre at [employer]. Informed him that Dr. Kiet Diaz did not order any urine tests. Advised to call patient's PCP. Name and contact number provided.

## 2017-12-06 ENCOUNTER — TELEPHONE (OUTPATIENT)
Dept: NEUROLOGY | Age: 82
End: 2017-12-06

## 2017-12-06 NOTE — TELEPHONE ENCOUNTER
Pt returning phone call from the nurse, requesting a returned phone call before 2:30pm as patient will be leaving the house for an appointment

## 2017-12-06 NOTE — TELEPHONE ENCOUNTER
----- Message from Cesar Mays sent at 12/6/2017  8:55 AM EST -----  Regarding: /Telephone  Pt wanted to know if her blood work results where in. Best contact number is [phone].

## 2017-12-07 ENCOUNTER — TELEPHONE (OUTPATIENT)
Dept: NEUROLOGY | Age: 82
End: 2017-12-07

## 2017-12-07 NOTE — TELEPHONE ENCOUNTER
Spoke with patient and informed her that, per Dr. Marshall Rai, Jason Arora of her labs look good except Vit B12 level is low. Please have her talk to her PCP about B12 replacement. \" Megan Parr will send lab results to Dr. Cody Walters and patient should follow up with them.  Patient was given an opportunity to ask questions, repeated information, and verbalized understanding.

## 2017-12-08 NOTE — TELEPHONE ENCOUNTER
Spoke with patient. She asked if there was anything else we could tell her from her blood results. Writer advised that, as I discussed with patient yesterday, labs were good except vitamin B12 was low. Patient was given an opportunity to ask questions, repeated information, and verbalized understanding.

## 2018-01-09 ENCOUNTER — TELEPHONE (OUTPATIENT)
Dept: NEUROLOGY | Age: 83
End: 2018-01-09

## 2018-01-09 NOTE — TELEPHONE ENCOUNTER
Spoke with patient's son, Soheila Sanchez (on HIPAA). He stated he and his siblings are continuing to see short term memory decline, she would ask her  several times if he had taken a nap on Thanksgiving Day. He feels his mother is depressed. He has noticed that she is passive aggression, which is not characteristic of her. He does not feel she is the same person that she was 6 months ago. He feels that she is becoming more and more of a private person. She would not let her son and daughter-in-law help her and told her daughter-in-law to go sit down and read a book. When she did so, Mrs. Ana Suarez got mad that her daughter-in-law was not helping her. Writer asked if patient has had neuropsychological testing done yet. Mr. Ana Suarez stated he was not sure. Writer stated I would call Dr. Gustavo Nassar office to determine if testing has been scheduled and/or completed and would call him back to let him know.

## 2018-01-09 NOTE — TELEPHONE ENCOUNTER
----- Message from Marisol Sanchez sent at 1/9/2018 10:59 AM EST -----  Regarding: Dr Duffy/telephone  Pt's son Geovanna Miles  p) [phone], would like to let Dr Jared Cao know about certain changes in his mother ,she has increasing short term memory loss, irritability  Fear and paranoia  And passive aggressiveness, and everything in life seems to be a big drama along with depression. Would like a call back to give some examples of her behavior.

## 2018-01-09 NOTE — TELEPHONE ENCOUNTER
Spoke with [facility] at Dr. Nick Lowe office. She stated patient is scheduled to come in on 2/1/18 for testing. She stated it typically takes about 2-3 weeks to get results faxed over to our office.

## 2018-01-09 NOTE — TELEPHONE ENCOUNTER
Returned son's call. Left a generic message on VM. Appreciated his input and noted observations. Neuropsych testing will help tease out depression vs dementia and we can proceed with appropriate treatment at that time. Asked him to call back if he feels the issue is more pressing and needs earlier attention.

## 2018-01-10 ENCOUNTER — OFFICE VISIT (OUTPATIENT)
Dept: NEUROLOGY | Age: 83
End: 2018-01-10

## 2018-01-10 VITALS
HEART RATE: 79 BPM | BODY MASS INDEX: 18.04 KG/M2 | HEIGHT: 68 IN | SYSTOLIC BLOOD PRESSURE: 158 MMHG | DIASTOLIC BLOOD PRESSURE: 69 MMHG | OXYGEN SATURATION: 98 % | RESPIRATION RATE: 18 BRPM | WEIGHT: 119 LBS

## 2018-01-10 DIAGNOSIS — R20.9 SKIN SENSATION DISTURBANCE: Primary | ICD-10-CM

## 2018-01-10 DIAGNOSIS — R20.2 PARESTHESIA OF BOTH LOWER EXTREMITIES: ICD-10-CM

## 2018-01-10 NOTE — PROGRESS NOTES
[facility] Neurology [facility]  [address] [place] [address]  Phone [phone] Fax [phone]  Test Date:  1/10/2018    Patient: Evangelista Irizarry : 1935 Physician: Osiris Evans    Sex: Female Height: 5' 8\" Ref Phys: Josefina Hastings   ID#: [id number] Weight: 119 lbs. Technician: Micaela Butler     Patient Complaints:  LOWER EXTREMITY PARESTHESIAS X 6 MONTHS    NCV & EMG Findings:  All nerve conduction studies were within normal limits. All left vs. right side differences were within normal limits. All F Wave latencies were within normal limits. All F Wave left vs. right side latency differences were within normal limits. All examined muscles (as indicated in the following table) showed no evidence of electrical instability. Impression:  Extensive electrodiagnostic examination of the bilateral lower extremities reveals no abnormalities. In particular, there is no evidence of a right or left lumbosacral motor radiculopathy. In addition, there is no evidence of a generalized sensorimotor polyneuropathy affecting the lower extremities. ___________________________  Chales Kyle Roz Hatchet, DO  Staff Neurologist  Diplomate, American Board of Psychiatry & Neurology             Nerve Conduction Studies  Anti Sensory Summary Table     Stim Site NR Peak (ms) Norm Peak (ms) P-T Amp (µV) Norm P-T Amp Onset (ms) Site1 Site2 Delta-P (ms) Dist (cm) Peter (m/s) Norm Peter (m/s)   Left Sup Peroneal Anti Sensory (Ant Lat Mall)  30.1°C   14 cm    2.8 <4.4 14.1 >5.0 1.8 14 cm Ant Lat Mall 2.8 14.0 50 >32   Right Sup Peroneal Anti Sensory (Ant Lat Mall)  29.9°C   14 cm    3.3 <4.4 10.7 >5.0 2.5 14 cm Ant Lat Mall 3.3 14.0 42 >32   Left Sural Anti Sensory (Lat Mall)  30.8°C   Calf    2.7 <4.0 10.8 >5.0 1.9 Calf Lat Mall 2.7 14.0 52 >35   Right Sural Anti Sensory (Lat Mall)  30.4°C   Calf    2.9 <4.0 10.2 >5.0 2.2 Calf Lat Mall 2.9 14.0 48 >35 Motor Summary Table     Stim Site NR Onset (ms) Norm Onset (ms) O-P Amp (mV) Norm O-P Amp Site1 Site2 Delta-0 (ms) Dist (cm) Peter (m/s) Norm Peter (m/s)   Left Peroneal Motor (Ext Dig Brev)  30.7°C   Ankle    3.6 <6.1 3.7 >2.5 B Fib Ankle 7.8 35.0 45 >38   B Fib    11.4  3.2  Poplt B Fib 1.7 10.0 59 >40   Poplt    13.1  3.3          Right Peroneal Motor (Ext Dig Brev)  30.1°C   Ankle    2.7 <6.1 3.9 >2.5 B Fib Ankle 7.6 35.0 46 >38   B Fib    10.3  3.2  Poplt B Fib 1.7 10.0 59 >40   Poplt    12.0  3.2          Left Tibial Motor (Abd Orellana Brev)  30°C   Ankle    3.7 <6.1 15.7 >3.0 Knee Ankle 8.2 36.0 44 >35   Knee    11.9  9.9          Right Tibial Motor (Abd Orellana Brev)  30.5°C   Ankle    3.5 <6.1 15.0 >3.0 Knee Ankle 8.6 36.0 42 >35   Knee    12.1  9.1            F Wave Studies     NR F-Lat (ms) Lat Norm (ms) L-R F-Lat (ms) L-R Lat Norm   Left Tibial (Mrkrs) (Abd Hallucis)  29.9°C      52.45 <61 2.40 <5.7   Right Tibial (Mrkrs) (Abd Hallucis)  30°C      54.85 <61 2.40 <5.7     EMG     Side Muscle Nerve Root Ins Act Fibs Psw Amp Dur Poly Recrt Int Delores Khang Comment   Right Ext Dig Brev Dp Br Peronel L5, S1 Nml Nml Nml Nml Nml 0 Nml Nml    Right AbdHallucis MedPlantar S1-2 Nml Nml Nml Nml Nml 0 Nml Nml    Right PostTibialis Tibial L5, S1 Nml Nml Nml Nml Nml 0 Nml Nml    Right Gastroc Tibial S1-2 Nml Nml Nml Nml Nml 0 Nml Nml    Right AntTibialis Dp Br Peronel L4-5 Nml Nml Nml Nml Nml 0 Nml Nml    Right RectFemoris Femoral L2-4 Nml Nml Nml Nml Nml 0 Nml Nml    Left Ext Dig Brev Dp Br Peronel L5, S1 Nml Nml Nml Nml Nml 0 Nml Nml    Left AntTibialis Dp Br Peronel L4-5 Nml Nml Nml Nml Nml 0 Nml Nml    Left PostTibialis Tibial L5, S1 Nml Nml Nml Nml Nml 0 Nml Nml    Left Gastroc Tibial S1-2 Nml Nml Nml Nml Nml 0 Nml Nml    Left RectFemoris Femoral L2-4 Nml Nml Nml Nml Nml 0 Nml Nml          Waveforms:

## 2018-01-25 ENCOUNTER — TELEPHONE (OUTPATIENT)
Dept: NEUROLOGY | Age: 83
End: 2018-01-25

## 2018-01-25 NOTE — TELEPHONE ENCOUNTER
Pt calling to cancel appt for her \"4 hour neuro test\". PSR informed her she was not scheduled here in the office for any tests. Please call back to clarify.

## 2018-01-25 NOTE — TELEPHONE ENCOUNTER
Spoke with patient. She stated \"I want to cancel my appointment for February 1st\". Writer advised that we do not have her scheduled for an appointment on that date. Writer stated she could be referring to an appointment with Dr. Rosario Azul. Writer advised for patient to call Dr. Zapien Bun office and discuss with them. Contact number provided. Patient was given an opportunity to ask questions, repeated information, and verbalized understanding.

## 2018-02-02 ENCOUNTER — TELEPHONE (OUTPATIENT)
Dept: NEUROLOGY | Age: 83
End: 2018-02-02

## 2018-02-02 NOTE — TELEPHONE ENCOUNTER
Spoke with Zackary Saavedra at Dr. Bettie Johns office. Requested most recent lab results and office notes.

## 2018-02-02 NOTE — TELEPHONE ENCOUNTER
Spoke with patient's son. Informed him of Dr. Macey George findings with patient's appointment. He stated his dad told him that his mother had testing at a psychiatrist office and \"everything was age appropriate\". He is not sure where this testing was done. Writer stated we referred patient to Dr. Casie Solitario office for neuropsych testing and provided him with that number. He also reported that his mother recently saw her PCP, Dr. Sofi Sr and was told she is Vitamin B12 deficient and has been placed on a supplement. Writer advised that Dr. Alireza Duke ordered labs back in November 2017 and patient was vitamin B12 deficient then and was instructed to follow up with her PCP for replacement but he did not seem to think she followed up as she has just started vitamin B12 replacement this week. He wanted to know if the vitamin B12 deficiently could cause short term memory loss and depression/mood. Advised will discuss this with Dr. Alireza Duke and will call patient back with her recommendations.

## 2018-02-02 NOTE — TELEPHONE ENCOUNTER
Potentially, but I still wanted her to have the neuropsych testing. At the very least she should f/u with me if the family is still concerned about her memory.

## 2018-02-02 NOTE — TELEPHONE ENCOUNTER
Yasemin - Please call Pt's son Loreto Boston  (p) [phone] to make him aware that his mother cancelled her neuropsych testing and f/u appt with me. The note states that her \" said this appointment ws cancelled due to other issues at this time. \"

## 2018-02-06 ENCOUNTER — DOCUMENTATION ONLY (OUTPATIENT)
Dept: NEUROLOGY | Age: 83
End: 2018-02-06

## 2018-02-06 NOTE — TELEPHONE ENCOUNTER
----- Message from Asif Lu sent at 2/6/2018 10:08 AM EST -----  Regarding: Dr. Watkins Forward  The patient's son Sirena Llanos missed a call from the doctor and is requesting a call back.  (a)[phone]

## 2018-02-06 NOTE — PROGRESS NOTES
I received labs from Dr. Erwin Collins 1/22/18  B12 154  TSH 0.64  CMP Glu 104  CBC with diff wnl  FLP - , HDL 78

## 2018-02-06 NOTE — TELEPHONE ENCOUNTER
Spoke with patient's son. Informed him of Dr. Garrett Ley recommendations. Mr. Preeti Sorto was given an opportunity to ask questions, repeated information, and verbalized understanding.

## 2018-02-06 NOTE — TELEPHONE ENCOUNTER
Yasemin - Please call her son:  I spoke with Dr. Erwin Collins last week about the pt. He is well aware of the communication that we have had with the pt recently and her cancelled appointment. Dr. Erwin Collins had also recommended she see a neuropsychologist, but had mentioned a different name to her not realizing that I had referred her to Dr. Wright Beam office. Dr. Erwin Collins was going to refer her to Dr. Courtney Maldonado. I think I should leave this up to Dr. Erwin Collins for now. I believe the pt is easily confused and having too many physicians involved may contribute. I am certainly happy to see her back in clinic after the neuropsych testing is completed if needed.

## 2018-02-06 NOTE — TELEPHONE ENCOUNTER
Spoke with patient's son. Informed him of Dr. Zakia Rizo recommendations. He asked when his mother was informed of her vitamin B12 deficiency. Writer stated patient was called on 11/27/17 (see result note and phone note) and patient stated that she was in a meeting and would have to call the office back. At that time lab results were faxed to Dr. Karin Acosta office for Dr. Trisha Villa and patient to follow up on her vitamin B12 deficiency and replacement therapy. Patient called the office on 12/6/17 asking about her blood work results. This nurse left two messages for the patient before actually speaking with her on 12/7/17 (see phone note dated 12/6/17). I also spoke with patient on 12/8/17 about her blood work results (see phone note dated 12/7/17). Patient's son stated that his parents were just informed by Dr. Trisha Villa about patient's vitamin B12 deficiency about 2 weeks ago. He stated that his parents also reported that his mother had \"extensive testing with a psychiatrist and everything checked out ok\". Writer advised that we do not have any record of that information as patient did not tell us who she saw and where. He asked about EMG testing and I informed him of when that test was completed. He stated his parents have become very angry over this situation and he feels if he were to call and tell his mother that she needs to have neuropsych testing done and/or follow up with Dr. Uribe Bound will be very belligerent\". He asked if Dr. Adelita Pierson could call to discuss this with his mother. He also stated that his mother is not aware that he has called and would like to keep this confidential. Will forward message to Dr. Adelita Pierson.

## 2018-06-18 ENCOUNTER — OFFICE VISIT (OUTPATIENT)
Dept: OBGYN CLINIC | Age: 83
End: 2018-06-18

## 2018-06-18 VITALS
DIASTOLIC BLOOD PRESSURE: 74 MMHG | HEIGHT: 68 IN | WEIGHT: 121 LBS | SYSTOLIC BLOOD PRESSURE: 140 MMHG | BODY MASS INDEX: 18.34 KG/M2

## 2018-06-18 DIAGNOSIS — N76.0 VAGINITIS AND VULVOVAGINITIS: Primary | ICD-10-CM

## 2018-06-18 NOTE — PROGRESS NOTES
Chief Complaint   No chief complaint on file. DOMINGO Nicholson is a 81 y.o. female who presents for the evaluation of vaginal burning x 4-5 days ago. No odor, no discharge   No LMP recorded. Patient has had a hysterectomy. Is not Sexually Active        Past Medical History:   Diagnosis Date    Arrhythmia     palpitations - no problems. 2/7/17: OCASSSIONALLY & BEING OBSERVED BY DR. Daria Gibson. MITRAL VALVE REGURGITATION R/T RHUMATIC FEVER    H/O cataract     s/p extraction    Hearing loss     Has TM graft    History of poliomyelitis     Though had no symptoms, only tested positive.  Osteopenia     Rheumatic fever     AS A CHILD. Past Surgical History:   Procedure Laterality Date    HX APPENDECTOMY  1983    HX CHOLECYSTECTOMY      HX GI      COLONOSCOPY    HX HEENT      tonsillectomy    HX HEENT      BILATERAL CATARACT.     HX HYSTERECTOMY  1983    with oophorectomy    HX ORTHOPAEDIC      RIGHT KNEE ARTHROSCOPY WITH MINUSCUS REPAIR    HX ORTHOPAEDIC      LEFT KNEE ATHROSCOPY WITH MINUSCUS REPAIR.    HX OTHER SURGICAL  2001    colonoscopy    NEUROLOGICAL PROCEDURE UNLISTED      CERVICAL FUSION     Social History     Occupational History          Social History Main Topics    Smoking status: Never Smoker    Smokeless tobacco: Never Used    Alcohol use 4.2 oz/week     7 Glasses of wine per week      Comment: glass of wine or mixed drink - one per night    Drug use: No    Sexual activity: Not on file     Family History   Problem Relation Age of Onset    Blindness Mother      Dec 87yo   Keary Roup Dementia Mother     Heart Attack Father      Dec 44yo    Heart Attack Sister      Dec 81yo    No Known Problems Son     No Known Problems Daughter        Allergies   Allergen Reactions    Latex Rash    Amantadine Other (comments)     \"My mind doesn't think correctly\", depression    Opioids - Morphine Analogues Other (comments)     \"My mind doesn't think correctly\", depression     Prior to Admission medications    Medication Sig Start Date End Date Taking? Authorizing Provider   furosemide (LASIX) 20 mg tablet as needed. 9/12/17   Historical Provider   chlorthalidone (HYGROTEN) 25 mg tablet  10/3/17   Historical Provider   TURMERIC ROOT EXTRACT PO Take 1,000 mg by mouth daily. Historical Provider   hydroCHLOROthiazide (HYDRODIURIL) 25 mg tablet 1/4 tablet by mouth daily 12/7/16   Historical Provider   amoxicillin (AMOXIL) 500 mg capsule Take 2,000 mg by mouth. 4 capsules by mouth 1 hour prior to dental procedure    Historical Provider   ofloxacin (FLOXIN) 0.3 % otic solution Administer 5 Drops in left ear two (2) times a day. 2/14/17   Ellie Muñiz MD   ibuprofen (MOTRIN) 800 mg tablet Take 1 Tab by mouth every six (6) hours as needed for Pain. 2/14/17   Ellie Muñiz MD   cholecalciferol (VITAMIN D3) 1,000 unit cap Take  by mouth daily. Historical Provider   benzonatate (TESSALON) 200 mg capsule Take 200 mg by mouth as needed for Cough. Historical Provider   calcium carbonate (TUMS) 300 mg (750 mg) chewable tablet Take 1 Tab by mouth daily. Historical Provider   montelukast (SINGULAIR) 10 mg tablet Take 1 Tab by mouth as needed. 7/25/16   Historical Provider   DYMISTA 137-50 mcg/spray spry 1 [place] by Both Nostrils route as needed. Up to twice daily  8/8/16   Historical Provider   aspirin 81 mg tablet Take 81 mg by mouth daily.     Historical Provider        Review of Systems: History obtained from the patient  Constitutional: negative for weight loss, fever, night sweats  Breast: negative for breast lumps, nipple discharge, galactorrhea  GI: negative for change in bowel habits, abdominal pain, black or bloody stools  : negative for frequency, dysuria, hematuria, vaginal discharge  MSK: negative for back pain, joint pain, muscle pain  Skin: negative for itching, rash, hives  Psych: negative for anxiety, depression, change in mood      Objective:  Visit Vitals     5' 8\" (1.727 m)    Wt 121 lb (54.9 kg)    BMI 18.4 kg/m2       Physical Exam:   PHYSICAL EXAMINATION    Constitutional  · Appearance: well-nourished, well developed, alert, in no acute distress      Genitourinary  · External Genitalia: normal appearance for age, no discharge present, no tenderness present, no inflammatory lesions present, no masses present, severe atrophy present, pH consistent with Atrophic Vaginitis   · Urethra: appears normal for age, severe atrophy  · Cervix:Absent   · Uterus: Absent  · Adnexa: Absent  · Perineum: perineum within normal limits, no evidence of trauma, no rashes or skin lesions present  · Anus: anus within normal limits, no hemorrhoids present  · Inguinal Lymph Nodes: no lymphadenopathy present    Skin  · General Inspection: no rash, no lesions identified    Neurologic/Psychiatric  · Mental Status:  · Orientation: grossly oriented to person, place and time  · Mood and Affect: mood normal, affect appropriate    Assessment:     Atrophic changes     Plan:     Premarin cream externally  RTO if no change    Dinorah Duomnt 88, WHNP/CNM      RTO prn if symptoms persist or worsen. Instructions given to pt. Handouts given to pt.

## 2018-07-03 ENCOUNTER — TELEPHONE (OUTPATIENT)
Dept: OBGYN CLINIC | Age: 83
End: 2018-07-03

## 2018-07-03 NOTE — TELEPHONE ENCOUNTER
Patient calling stating that she has vaginal irritation and burning and was using betamethasone ointment rx'd by Chaparrita Alicia NP and patient states its not helping. She is nothing any other symptoms other than the burning and irritation. She is requesting a rx to help. Please advise.

## 2018-07-05 NOTE — TELEPHONE ENCOUNTER
JEANNE Caceres LPN        Caller: Unspecified (2 days ago, 12:18 PM)                     Merrick Gaitan,   I was not on call when this call was taken, Amish Mcdermott was and this was a patient she had seen, I do not see that Amish Mcdermott was made aware of this message. Dinorah's notes clearly state that if symptoms do not improve she needs to be seen again. Please call patient and help her make a follow up apt with Amish Mcdermott or one of our physicians as Amish Mcdermott is not back until Tuesday.    Thank you   Sherwin

## 2018-10-11 ENCOUNTER — TELEPHONE (OUTPATIENT)
Dept: NEUROLOGY | Age: 83
End: 2018-10-11

## 2018-10-11 ENCOUNTER — OFFICE VISIT (OUTPATIENT)
Dept: NEUROLOGY | Age: 83
End: 2018-10-11

## 2018-10-11 VITALS
RESPIRATION RATE: 18 BRPM | BODY MASS INDEX: 18.43 KG/M2 | HEIGHT: 68 IN | WEIGHT: 121.6 LBS | SYSTOLIC BLOOD PRESSURE: 138 MMHG | HEART RATE: 88 BPM | DIASTOLIC BLOOD PRESSURE: 84 MMHG | TEMPERATURE: 97.9 F | OXYGEN SATURATION: 97 %

## 2018-10-11 DIAGNOSIS — R41.3 MEMORY LOSS: ICD-10-CM

## 2018-10-11 DIAGNOSIS — R20.2 PARESTHESIA OF BOTH LOWER EXTREMITIES: Primary | ICD-10-CM

## 2018-10-11 DIAGNOSIS — F41.9 ANXIETY: ICD-10-CM

## 2018-10-11 RX ORDER — ZOSTER VACCINE RECOMBINANT, ADJUVANTED 50 MCG/0.5
KIT INTRAMUSCULAR
COMMUNITY
Start: 2018-09-14 | End: 2019-04-22

## 2018-10-11 NOTE — TELEPHONE ENCOUNTER
Called patient and spoke with . Gave nurse number of pcp. States he was here this morning and that Dr. Val Lara wants test results from the psychiatrist. States he is going to bring a 3 page report to the office tomorrow . Asked him for name and made him aware we can receive fax from psychiatrist office. States he feels he is faster than the mail and wants to bring it over tomorrow.

## 2018-10-11 NOTE — MR AVS SNAPSHOT
[place] [id number] [address] [place] [address] 
[phone] Patient: Glendy Gomez MRN: [id number] DNT:[id number]/96/3831 Visit Information Date & Time Provider Department Dept. Phone Encounter #  
 10/11/2018  9:20 AM [facility] [address] [place] [address] Road [id number] Follow-up Instructions Return in about 6 months (around 4/11/2019). Upcoming Health Maintenance Date Due DTaP/Tdap/Td series (1 - Tdap) 9/10/1956 Shingrix Vaccine Age 50> (1 of 2) 9/10/1986 GLAUCOMA SCREENING Q2Y 9/10/2001 Bone Densitometry (Dexa) Screening 9/10/2001 Pneumococcal 65+ Low/Medium Risk (1 of 2 - PCV13) 9/10/2001 MEDICARE YEARLY EXAM 3/14/2018 Influenza Age 5 to Adult 8/1/2018 Allergies as of 10/11/2018  Review Complete On: 10/11/2018 By: Shyann Brothers MD  
  
 Severity Noted Reaction Type Reactions Latex  10/03/2011    Rash Amantadine  12/02/2016    Other (comments) \"My mind doesn't think correctly\", depression Opioids - Morphine Analogues  12/02/2016    Other (comments) \"My mind doesn't think correctly\", depression Current Immunizations  Never Reviewed No immunizations on file. Not reviewed this visit You Were Diagnosed With   
  
 Codes Comments Paresthesia of both lower extremities    -  Primary ICD-10-CM: R20.2 ICD-9-CM: 782.0 Memory loss     ICD-10-CM: R41.3 ICD-9-CM: 780.93 Anxiety     ICD-10-CM: F41.9 ICD-9-CM: 300.00 Vitals BP Pulse Temp Resp Height(growth percentile) Weight(growth percentile) 138/84 (BP 1 Location: Right arm, BP Patient Position: Sitting) 88 97.9 °F (36.6 °C) (Oral) 18 5' 8\" (1.727 m) 121 lb 9.6 oz (55.2 kg) SpO2 BMI OB Status Smoking Status 97% 18.49 kg/m2 Hysterectomy Never Smoker Vitals History BMI and BSA Data  Body Mass Index Body Surface Area  
 18.49 kg/m 2 1.63 m 2  
  
  
 Preferred Pharmacy Pharmacy Name Phone [id number] - [place], [address] [facility] [phone] Your Updated Medication List  
  
   
This list is accurate as of 10/11/18 10:13 AM.  Always use your most recent med list. ALLEGRA PO Take  by mouth. amoxicillin 500 mg capsule Commonly known as:  AMOXIL Take 2,000 mg by mouth. 4 capsules by mouth 1 hour prior to dental procedure  
  
 aspirin 81 mg tablet Take 81 mg by mouth daily. benzonatate 200 mg capsule Commonly known as:  TESSALON Take 200 mg by mouth as needed for Cough. chlorthalidone 25 mg tablet Commonly known as:  HYGROTEN  
daily. DYMISTA 137-50 mcg/spray [place] Generic drug:  azelastine-fluticasone 1 [place] by Both Nostrils route as needed. Up to twice daily  
  
 furosemide 20 mg tablet Commonly known as:  LASIX  
as needed. hydroCHLOROthiazide 25 mg tablet Commonly known as:  HYDRODIURIL  
1/4 tablet by mouth daily  
  
 ibuprofen 800 mg tablet Commonly known as:  MOTRIN Take 1 Tab by mouth every six (6) hours as needed for Pain.  
  
 montelukast 10 mg tablet Commonly known as:  SINGULAIR Take 1 Tab by mouth as needed. SHINGRIX (PF) 50 mcg/0.5 mL Susr injection Generic drug:  varicella-zoster recombinant (PF)  
  
 TUMS 300 mg (750 mg) chewable tablet Generic drug:  calcium carbonate Take 1 Tab by mouth daily. VITAMIN D3 1,000 unit Cap Generic drug:  cholecalciferol Take  by mouth daily. Follow-up Instructions Return in about 6 months (around 4/11/2019). Introducing [facility]! Dear Cody Gabriel: Thank you for requesting a TicketBase account. Our records indicate that you already have an active TicketBase account. You can access your account anytime at [email or web address] Did you know that you can access your hospital and ER discharge instructions at any time in PureHistory? You can also review all of your test results from your hospital stay or ER visit. Additional Information If you have questions, please visit the Frequently Asked Questions section of the PureHistory website at [email or web address] Remember, PureHistory is NOT to be used for urgent needs. For medical emergencies, dial 911. Now available from your iPhone and Android! Please provide this summary of care documentation to your next provider. Your primary care clinician is listed as Jenise Floyd. If you have any questions after today's visit, please call [phone].

## 2018-10-11 NOTE — PROGRESS NOTES
Neurology Consult Note HISTORY PROVIDED BY: patient and spouse Chief Complaint:  
Chief Complaint Patient presents with  
 Other  
  skin sensation disturbance follow up c/o burning feet Subjective:  
Pt is an 81 y.o. right handed female last seen in clinic on 11/17/17 in f/u. She was initially evaluated for memory loss, at last visit had new c/o burning in her feet to mid-leg for the previous 6-8 months, no numbness or weakness. Exam with markedly diminished vibratory sensation, mod dec position sense on left, intact on right, and dysmetria with HTS bilaterally, o/w non-focal and unremarkable (scored 27/30 on MMSE at 10/24/17 visit.)  Possible early or mild peripheral neuropathy without obvious etiology at this time. Recommended NCS/EMG to assess for PN and labs for underlying etiology ordered - B12/MMA, Vit E, Copper, RPR, SPEP with IF, UPEP, HgbA1C. F/u as planned after neuropsychological testing completed. She returns for delayed f/u. She had NCS/EMG on 1/10/18 with Dr. Viktoria Madrid, this was a normal study. Labs revealed low B12 - 177. Pt reports burning pain only in her feet. Her  reminds her that she mentioned the sxs going up her legs. No back pain or radicular pain. No loss of bladder control. No change in gait. The pain is not that bothersome to her, not keeping her awake. Her PCP started gabapentin, but she states it worsened her anxiety. She tells me that she is completely nervous about being here, she is afraid something is wrong with her mind. Her  has noticed that she has memory loss, gives example of pt getting confused about what day it is, she often complains of feeling nervous. She is still driving, got lost once and had to call her  to find her way about 3-4 months ago. No other concerning behaviors. No prior h/o anxiety, not on treatment for this. Her  states she has seen a psychologist and was told everything was fine.   He cannot remember who this was or when, but believes it was this year. I did not receive a report from Dr. Lucrecia Patel, whom I referred her to, and no notes or appointments with Dr. Tanja Hilario in the chart, the neuropsychologist Dr. Magnus Walsh referred her to. Past Medical History:  
Diagnosis Date  Arrhythmia   
 palpitations - no problems. 2/7/17: OCASSSIONALLY & BEING OBSERVED BY DR. Talon Wei. MITRAL VALVE REGURGITATION R/T RHUMATIC FEVER  
 H/O cataract s/p extraction  Hearing loss Has TM graft  History of poliomyelitis Though had no symptoms, only tested positive.  Mitral regurgitation  Osteopenia  Rheumatic fever AS A CHILD. Past Surgical History:  
Procedure Laterality Date [address]  HX CHOLECYSTECTOMY  HX GI    
 COLONOSCOPY  
 HX HEENT    
 tonsillectomy  HX HEENT    
 BILATERAL CATARACT. [address]  
 with oophorectomy  HX ORTHOPAEDIC    
 RIGHT KNEE ARTHROSCOPY WITH MINUSCUS REPAIR  
 HX ORTHOPAEDIC    
 LEFT KNEE ATHROSCOPY WITH MINUSCUS REPAIR.  
 HX OTHER SURGICAL  2001  
 colonoscopy  NEUROLOGICAL PROCEDURE UNLISTED    
 CERVICAL FUSION Social History Social History  Marital status:  Spouse name: N/A  
 Number of children: N/A  
 Years of education: N/A Occupational History [address] [place] [address] Social History Main Topics  Smoking status: Never Smoker  Smokeless tobacco: Never Used  Alcohol use 4.2 oz/week  
  7 Glasses of wine per week Comment: glass of wine or mixed drink - one per night  Drug use: No  
 Sexual activity: Not Currently Other Topics Concern  Not on file Social History Narrative , lives in [place]. With  Family History Problem Relation Age of Onset  Blindness Mother Dec 89yo  Dementia Mother  Heart Attack Father Dec 42yo  Heart Attack Sister Dec 81yo  No Known Problems Son  No Known Problems Daughter Objective:  
Review of Systems : Per HPI, o/w neg Allergies Allergen Reactions  Latex Rash  Amantadine Other (comments) \"My mind doesn't think correctly\", depression  Opioids - Morphine Analogues Other (comments) \"My mind doesn't think correctly\", depression Meds: Outpatient Medications Prior to Visit Medication Sig Dispense Refill  fexofenadine HCl (ALLEGRA PO) Take  by mouth.  furosemide (LASIX) 20 mg tablet as needed.  chlorthalidone (HYGROTEN) 25 mg tablet daily.  hydroCHLOROthiazide (HYDRODIURIL) 25 mg tablet 1/4 tablet by mouth daily  amoxicillin (AMOXIL) 500 mg capsule Take 2,000 mg by mouth. 4 capsules by mouth 1 hour prior to dental procedure  ibuprofen (MOTRIN) 800 mg tablet Take 1 Tab by mouth every six (6) hours as needed for Pain. 60 Tab 1  cholecalciferol (VITAMIN D3) 1,000 unit cap Take  by mouth daily.  benzonatate (TESSALON) 200 mg capsule Take 200 mg by mouth as needed for Cough.  calcium carbonate (TUMS) 300 mg (750 mg) chewable tablet Take 1 Tab by mouth daily.  montelukast (SINGULAIR) 10 mg tablet Take 1 Tab by mouth as needed.  DYMISTA 137-50 mcg/spray spry 1 [place] by Both Nostrils route as needed. Up to twice daily  aspirin 81 mg tablet Take 81 mg by mouth daily.  TURMERIC ROOT EXTRACT PO Take 1,000 mg by mouth daily.  ofloxacin (FLOXIN) 0.3 % otic solution Administer 5 Drops in left ear two (2) times a day. (Patient not taking: Reported on 10/11/2018) 10 mL 3 No facility-administered medications prior to visit. Imaging: MRI Results (most recent): No results found for this or any previous visit. CT Results (most recent): 
 
Results from Hospital Encounter encounter on 01/09/13 CT LOWER EXT LT W CONT Narrative **Final Report** 
 
 
ICD Codes / Adm. Diagnosis: 836.1   / Tear of lateral cartilage or m Examination:  CT LOWER EXT W CON LT  - [id number] - Jan 9 2013 11:48AM 
 Accession No:  [id number] Reason:  LMT REPORT: 
INDICATION: LMT evaluate lateral meniscus, lateral knee pain 836.2, 781 COMPARISON: None EXAM: Following arthrography, axial CT imaging of the left knee is performed  
with coronal and sagittal reformations. FINDINGS: There is contrast material demonstrated within the left knee  
joint. There are intact appearing cruciate and collateral ligaments. No  
meniscal tear is demonstrated. There is a grade 4 chondral defect of the mid  
lateral tibial plateau which measures 1.3 cm anterior posterior and 1.5 cm  
transverse. Subtle grade 2 chondral derangement in the lateral femoral  
condyle is shown. There is also grade 4 chondral derangement of the medial  
patellar facet and intermediate grade derangement of the lateral patellar  
facet and trochlear groove. Loose bodies are suspected in the popliteal  
tendon sheath and possibly within the medial recess of the suprapatellar  
compartment. 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 No  
meniscal tear demonstrated. Signing/Reading Doctor: Jessica Gordon. Conor Bajwa ([id number]) Approved: DAHIANA Bajwa ([id number])  Jan 9 2013  3:02PM                       
 
 
   
  
 
Reviewed records in [address] [place] [address] and [place] tab today Lab Review Results for orders placed or performed in visit on 11/17/17 VITAMIN B12 Result Value Ref Range Vitamin B12 177 (L) 211 - 946 pg/mL METHYLMALONIC ACID Result Value Ref Range METHYLMALONIC ACID, SERUM 291 0 - 378 nmol/L  
VITAMIN E Result Value Ref Range Vitamin E (Alpha Tocopherol) 17.6 6.5 - 21.5 mg/L  
COPPER Result Value Ref Range Copper, serum 126 72 - 166 ug/dL RPR Result Value Ref Range RPR Non Reactive Non Reactive SPEP AND BAL, SERUM Result Value Ref Range Immunoglobulin G, Qt. 668 (L) 700 - 1600 mg/dL Immunoglobulin A, Qt. 106 64 - 422 mg/dL Immunoglobulin M, Qt. 70 26 - 217 mg/dL Protein, total 6.9 6.0 - 8.5 g/dL Albumin 4.1 2.9 - 4.4 g/dL Alpha-1-Globulin 0.3 0.0 - 0.4 g/dL Alpha-2-Globulin 0.8 0.4 - 1.0 g/dL Beta Globulin 1.1 0.7 - 1.3 g/dL Gamma Globulin 0.6 0.4 - 1.8 g/dL M-Vikas Not Observed Not Observed g/dL Globulin, total 2.8 2.2 - 3.9 g/dL A/G ratio 1.5 0.7 - 1.7 Immunofixation Result Comment Please note Comment HEMOGLOBIN A1C WITH EAG Result Value Ref Range Hemoglobin A1c 5.4 4.8 - 5.6 % Estimated average glucose 108 mg/dL Exam: 
Visit Vitals  /84 (BP 1 Location: Right arm, BP Patient Position: Sitting)  Pulse 88  Temp 97.9 °F (36.6 °C) (Oral)  Resp 18  Ht 5' 8\" (1.727 m)  Wt 55.2 kg (121 lb 9.6 oz)  SpO2 97%  BMI 18.49 kg/m2 General:  Alert, cooperative, no distress. Head:  Normocephalic, without obvious abnormality, atraumatic. Respiratory: 
Heart:   Non labored breathing Regular rate and rhythm, no murmurs Neck:     
Extremities: Warm, no cyanosis or edema. Pulses: 2+ radial pulses. Neurologic:  MS: Anxious appearing, Alert &O x 4, speech intact. Language -intact. Attention and fund of knowledge appropriate. Cranial Nerves: 
II: visual fields II: pupils II: optic disc   
III,VII: ptosis none III,IV,VI: extraocular muscles  EOMI, no nystagmus V: facial light touch sensation VII: facial muscle function   symmetric VIII: hearing intact IX: soft palate elevation XI: trapezius strength XI: sternocleidomastoid strength XII: tongue Motor: normal bulk and tone, no tremor Strength: 5/5 throughout, no PD Sensory: intact to LT, PP no prox to distal gradient, markedly diminished vibratory sensation Coordination: HTS with dysmetria bilaterally, romberg neg Gait: normal gait , able to tandem walk Reflexes: 2+ symmetric, toes mute Mini Mental State Exam 10/24/2017 What is the Year 1 What is the Season 1 What is the Date 0  
 What is the Day 0 What is the Month 1 Where are we State 1 Where are we Country 1 Where are we [place] or [facility] 1 Where are we Floor 1 Name three objects, then ask the patient to say them 3 Serial sevens Subtract 7 from 100 in increments 5 Ask for the three objects repeated above 2 Name a pencil 1 Name a watch 1 Have the patient repeat this phrase \"No ifs, ands, or buts\" 1 Three stage command: Take the paper in your right hand 1 Fold the paper in half 1 Put the paper on the floor 1 Read and obey the following: CLOSE YOUR EYES 1 Have the patient write a sentence 1 Have the patient copy a figure 1 Mini Mental Score 27 Some recent data might be hidden Assessment/Plan Pt is an 81 y.o. right handed female being evaluated for memory loss and in November, 2017 c/o burning in her feet to mid-leg for the previous 6-8 months, no numbness, no weakness, no LBP or radicular pain, now reporting the pain is only in her feet. No evidence of a large fiber neuropathy on NCS/EMG on 1/10/18 with Dr. Kisha Cardona. Exam - anxious appearing, with markedly diminished vibratory sensation, dysmetria with HTS bilaterally, normal strength, o/w non-focal and unremarkable (scored 27/30 on MMSE at 10/24/17 visit.)   
 
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 Reassured by stable exam compared to one year ago. She is not interested in starting a medication at this time for burning pain. Will continue to monitor for progression 
-Discussed memory loss and anxiety and possibility of early dementia vs a pseudodementia. Her  is going to call with the name of the neuropsychologist she saw and we will request the report. I will contact her by phone and discuss next steps and possible treatment of anxiety. -F/u made for 6 months for now, instructed to call in the interim if needed. ICD-10-CM ICD-9-CM 1. Paresthesia of both lower extremities R20.2 782.0 2. Memory loss R41.3 780.93   
3. Anxiety F41.9 300.00 Signed: Modesto Savage MD 
10/11/2018

## 2018-10-11 NOTE — TELEPHONE ENCOUNTER
Attempt to call patient. Received voice mail stating mail box was full. Unable to speak to patient at this time.

## 2018-10-11 NOTE — PROGRESS NOTES
Chief Complaint Patient presents with  
 Other  
  skin sensation disturbance follow up c/o burning feet 1. Have you been to the ER, urgent care clinic since your last visit? Hospitalized since your last visit? no 
 
2. Have you seen or consulted any other health care providers outside of the [address] [place] [address] since your last visit? Include any pap smears or colon screening. no 
 
Visit Vitals  /84 (BP 1 Location: Right arm, BP Patient Position: Sitting)  Pulse 88  Temp 97.9 °F (36.6 °C) (Oral)  Resp 18  Ht 5' 8\" (1.727 m)  Wt 55.2 kg (121 lb 9.6 oz)  SpO2 97%  BMI 18.49 kg/m2

## 2018-10-22 ENCOUNTER — TELEPHONE (OUTPATIENT)
Dept: NEUROLOGY | Age: 83
End: 2018-10-22

## 2018-10-23 RX ORDER — LANOLIN ALCOHOL/MO/W.PET/CERES
500 CREAM (GRAM) TOPICAL DAILY
COMMUNITY
Start: 2018-10-23

## 2018-10-23 RX ORDER — VENLAFAXINE HYDROCHLORIDE 37.5 MG/1
37.5 CAPSULE, EXTENDED RELEASE ORAL DAILY
Qty: 30 CAP | Refills: 5 | Status: SHIPPED | OUTPATIENT
Start: 2018-10-23 | End: 2019-04-22 | Stop reason: SDDI

## 2018-10-23 NOTE — TELEPHONE ENCOUNTER
Called pt again: Discussed results of neuropsych testing. She needs treatment for anxiety and she agrees. Will start Effexor XR 37.5mg daily, side effects reviewed. I also asked about B12, she has not been taking this. When I last checked her level in 11/2017 it was only 177. She will restart B12 1000mcg and will talk to Dr. Magnus Walsh about rechecking her level and making certain she does not need B12 injection or high dose oral therapy. She asked that I forward this information to Dr. Magnus Walsh.

## 2018-10-23 NOTE — TELEPHONE ENCOUNTER
Received report from OV 2/22/18 with Colletta Sicks, PhD, Licensed Clinical Psychologist.   He found no problems with her memory. She scored below avg only on mental math, a long standing issue for the pt. He noted anxiety impacting her sleep, appetite and general sense of well-being. B12 replacement?

## 2018-11-06 ENCOUNTER — TELEPHONE (OUTPATIENT)
Dept: NEUROLOGY | Age: 83
End: 2018-11-06

## 2018-11-06 NOTE — TELEPHONE ENCOUNTER
Dr. Jacque Gama office, pt's PCP, calling to get last office note faxed for pt's appt today.  Fax: [phone]

## 2019-04-22 ENCOUNTER — OFFICE VISIT (OUTPATIENT)
Dept: NEUROLOGY | Age: 84
End: 2019-04-22

## 2019-04-22 ENCOUNTER — TELEPHONE (OUTPATIENT)
Dept: NEUROLOGY | Age: 84
End: 2019-04-22

## 2019-04-22 VITALS
OXYGEN SATURATION: 97 % | SYSTOLIC BLOOD PRESSURE: 102 MMHG | BODY MASS INDEX: 18.04 KG/M2 | HEIGHT: 68 IN | WEIGHT: 119 LBS | HEART RATE: 69 BPM | DIASTOLIC BLOOD PRESSURE: 60 MMHG | RESPIRATION RATE: 16 BRPM

## 2019-04-22 DIAGNOSIS — R20.2 PARESTHESIA OF BOTH LOWER EXTREMITIES: ICD-10-CM

## 2019-04-22 DIAGNOSIS — E53.8 B12 DEFICIENCY: ICD-10-CM

## 2019-04-22 DIAGNOSIS — R41.3 MEMORY LOSS: Primary | ICD-10-CM

## 2019-04-22 RX ORDER — PERPHENAZINE 16 MG
TABLET ORAL
COMMUNITY
End: 2019-06-19

## 2019-04-22 NOTE — PROGRESS NOTES
Neurology Consult Note HISTORY PROVIDED BY: patient and son (lives in [place]) Chief Complaint:  
Chief Complaint Patient presents with  Neurologic Problem Subjective:  
Pt is an 81 y.o. right handed female last seen in clinic on 10/11/18 in f/u for memory loss and in November, 2017 c/o burning in her feet to mid-leg for the previous 6-8 months, no numbness, no weakness, no LBP or radicular pain, now reporting the pain is only in her feet. No evidence of a large fiber neuropathy on NCS/EMG on 1/10/18 with Dr. Eneida Wilson. B12 level was low 177. Exam - anxious appearing, with markedly diminished vibratory sensation, dysmetria with HTS bilaterally, normal strength, o/w non-focal and unremarkable (scored 27/30 on MMSE at 10/24/17 visit.)   
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 Reassured by stable exam compared to one year ago. She was not interested in starting a medication for burning pain. Continue to monitor for progression 
-Discussed memory loss and anxiety and possibility of early dementia vs a pseudodementia. Her  was going to call with the name of the neuropsychologist she saw and we will request the report. I will contact her by phone and discuss next steps and possible treatment of anxiety. She returns for f/u accompanied by her son. Received report from OV 2/22/18 with Mini Jones, PhD, Licensed Clinical Psychologist.  
He found no problems with her memory. She scored below avg only on mental math, a long standing issue for the pt. He noted anxiety impacting her sleep, appetite and general sense of well-being. I started her on Effexor XR 37.5mg daily by phone 10/22/18, she doesn't believe she is taking this (her son later verified by phone that this is not one of her home meds.)  Instructed to restart B12 1000mcg daily, she mentioned to her son that she had forgotten all about taking this, but now stating she is taking it. (Her son verified by phone after the visit that she is taking B12 supplement.)  She is still driving denies getting lost \"ever\", reminded her that she did get lost once and had to call her  to find her way. Her son mentions that they may be moving to [place] in the near future to be closer to him. Past Medical History:  
Diagnosis Date  Arrhythmia   
 palpitations - no problems. 2/7/17: OCASSSIONALLY & BEING OBSERVED BY DR. Karine Garcia. MITRAL VALVE REGURGITATION R/T RHUMATIC FEVER  
 H/O cataract s/p extraction  Hearing loss Has TM graft  History of poliomyelitis Though had no symptoms, only tested positive.  Mitral regurgitation  Osteopenia  Rheumatic fever AS A CHILD. Past Surgical History:  
Procedure Laterality Date [address]  HX CHOLECYSTECTOMY  HX GI    
 COLONOSCOPY  
 HX HEENT    
 tonsillectomy  HX HEENT    
 BILATERAL CATARACT. [address]  
 with oophorectomy  HX ORTHOPAEDIC    
 RIGHT KNEE ARTHROSCOPY WITH MINUSCUS REPAIR  
 HX ORTHOPAEDIC    
 LEFT KNEE ATHROSCOPY WITH MINUSCUS REPAIR.  
 HX OTHER SURGICAL  2001  
 colonoscopy  NEUROLOGICAL PROCEDURE UNLISTED    
 CERVICAL FUSION Social History Socioeconomic History  Marital status:  Spouse name: Not on file  Number of children: Not on file  Years of education: Not on file  Highest education level: Not on file Occupational History  Occupation:  Social Needs  Financial resource strain: Not on file  Food insecurity:  
  Worry: Not on file Inability: Not on file  Transportation needs:  
  Medical: Not on file Non-medical: Not on file Tobacco Use  Smoking status: Never Smoker  Smokeless tobacco: Never Used Substance and Sexual Activity  Alcohol use: Yes Alcohol/week: 4.2 oz Types: 7 Glasses of wine per week Comment: glass of wine or mixed drink - one per night  Drug use: No  
 Sexual activity: Not Currently Lifestyle  Physical activity:  
  Days per week: Not on file Minutes per session: Not on file  Stress: Not on file Relationships  Social connections:  
  Talks on phone: Not on file Gets together: Not on file Attends [employer] service: Not on file Active member of club or organization: Not on file Attends meetings of clubs or organizations: Not on file Relationship status: Not on file  Intimate partner violence:  
  Fear of current or ex partner: Not on file Emotionally abused: Not on file Physically abused: Not on file Forced sexual activity: Not on file Other Topics Concern  Not on file Social History Narrative , lives in [place]. With  Family History Problem Relation Age of Onset  Blindness Mother Dec 89yo  Dementia Mother  Heart Attack Father Dec 42yo  Heart Attack Sister Dec 81yo  No Known Problems Son  No Known Problems Daughter Objective:  
Review of Systems : Per HPI, o/w reviewed and neg Allergies Allergen Reactions  Latex Rash  Amantadine Other (comments) \"My mind doesn't think correctly\", depression  Opioids - Morphine Analogues Other (comments) \"My mind doesn't think correctly\", depression Meds: Outpatient Medications Prior to Visit Medication Sig Dispense Refill  cyanocobalamin (VITAMIN B12) 500 mcg tablet Take 1 Tab by mouth daily.  venlafaxine-SR (EFFEXOR-XR) 37.5 mg capsule Take 1 Cap by mouth daily. 30 Cap 5  
 fexofenadine HCl (ALLEGRA PO) Take  by mouth.  chlorthalidone (HYGROTEN) 25 mg tablet daily.  hydroCHLOROthiazide (HYDRODIURIL) 25 mg tablet 1/4 tablet by mouth daily  amoxicillin (AMOXIL) 500 mg capsule Take 2,000 mg by mouth. 4 capsules by mouth 1 hour prior to dental procedure  ibuprofen (MOTRIN) 800 mg tablet Take 1 Tab by mouth every six (6) hours as needed for Pain. 60 Tab 1  cholecalciferol (VITAMIN D3) 1,000 unit cap Take  by mouth daily.  calcium carbonate (TUMS) 300 mg (750 mg) chewable tablet Take 1 Tab by mouth daily.  montelukast (SINGULAIR) 10 mg tablet Take 1 Tab by mouth as needed.  DYMISTA 137-50 mcg/spray spry 1 [place] by Both Nostrils route as needed. Up to twice daily  aspirin 81 mg tablet Take 81 mg by mouth daily.  SHINGRIX, PF, 50 mcg/0.5 mL susr injection  furosemide (LASIX) 20 mg tablet as needed.  benzonatate (TESSALON) 200 mg capsule Take 200 mg by mouth as needed for Cough. No facility-administered medications prior to visit. Imaging: MRI Results (most recent): No results found for this or any previous visit. CT Results (most recent): 
Results from Hospital Encounter encounter on 01/09/13 CT LOWER EXT LT W CONT Narrative **Final Report** 
  
 
ICD Codes / Adm. Diagnosis: 836.1   / Tear of lateral cartilage or m Examination:  CT LOWER EXT W CON LT  - [id number] - Jan 9 2013 11:48AM 
Accession No:  [id number] Reason:  LMT REPORT: 
INDICATION: LMT evaluate lateral meniscus, lateral knee pain 836.1, 719 COMPARISON: None EXAM: Following arthrography, axial CT imaging of the left knee is performed  
with coronal and sagittal reformations. FINDINGS: There is contrast material demonstrated within the left knee  
joint. There are intact appearing cruciate and collateral ligaments. No  
meniscal tear is demonstrated. There is a grade 4 chondral defect of the mid  
lateral tibial plateau which measures 1.3 cm anterior posterior and 1.5 cm  
transverse. Subtle grade 2 chondral derangement in the lateral femoral  
condyle is shown. There is also grade 4 chondral derangement of the medial  
patellar facet and intermediate grade derangement of the lateral patellar facet and trochlear groove. Loose bodies are suspected in the popliteal  
tendon sheath and possibly within the medial recess of the suprapatellar  
compartment. 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 No  
meniscal tear demonstrated. Signing/Reading Doctor: Zi Devlin. Marianna Lane ([id number]) Approved: DAHIANA Lane ([id number])  Jan 9 2013  3:02PM                       
 
 
   
  
 
Reviewed records in [place] and media tab today Lab Review Results for orders placed or performed in visit on 11/17/17 VITAMIN B12 Result Value Ref Range Vitamin B12 177 (L) 211 - 946 pg/mL METHYLMALONIC ACID Result Value Ref Range METHYLMALONIC ACID, SERUM 291 0 - 378 nmol/L  
VITAMIN E Result Value Ref Range Vitamin E (Alpha Tocopherol) 17.6 6.5 - 21.5 mg/L  
COPPER Result Value Ref Range Copper, serum 126 72 - 166 ug/dL RPR Result Value Ref Range RPR Non Reactive Non Reactive SPEP AND BAL, SERUM Result Value Ref Range Immunoglobulin G, Qt. 668 (L) 700 - 1,600 mg/dL Immunoglobulin A, Qt. 106 64 - 422 mg/dL Immunoglobulin M, Qt. 70 26 - 217 mg/dL Protein, total 6.9 6.0 - 8.5 g/dL Albumin 4.1 2.9 - 4.4 g/dL Alpha-1-Globulin 0.3 0.0 - 0.4 g/dL Alpha-2-Globulin 0.8 0.4 - 1.0 g/dL Beta Globulin 1.1 0.7 - 1.3 g/dL Gamma Globulin 0.6 0.4 - 1.8 g/dL M-Vikas Not Observed Not Observed g/dL Globulin, total 2.8 2.2 - 3.9 g/dL A/G ratio 1.5 0.7 - 1.7 Immunofixation Result Comment Please note Comment HEMOGLOBIN A1C WITH EAG Result Value Ref Range Hemoglobin A1c 5.4 4.8 - 5.6 % Estimated average glucose 108 mg/dL Exam: 
Visit Vitals /60 Pulse 69 Resp 16 Ht 5' 8\" (1.727 m) Wt 54 kg (119 lb) SpO2 97% BMI 18.09 kg/m² General:  Alert, cooperative, no distress. Head:  Normocephalic, without obvious abnormality, atraumatic. Respiratory: 
Heart:   Non labored breathing Regular rate and rhythm, no murmurs Neck:     
Extremities: Warm, no cyanosis or edema. Pulses: 2+ radial pulses. Neurologic:  MS: Anxious appearing, Alert, speech intact. Language -see MMSE. Attention and fund of knowledge appropriate. Cranial Nerves: 
II: visual fields VFF II: pupils II: optic disc   
III,VII: ptosis none III,IV,VI: extraocular muscles  EOMI, no nystagmus V: facial light touch sensation VII: facial muscle function   symmetric VIII: hearing intact IX: soft palate elevation XI: trapezius strength XI: sternocleidomastoid strength XII: tongue Exam 10/2018: Motor: normal bulk and tone, no tremor Strength: 5/5 throughout, no PD Sensory: intact to LT, PP no prox to distal gradient, markedly diminished vibratory sensation Coordination: HTS with dysmetria bilaterally, romberg neg Gait: normal gait , able to tandem walk Reflexes: 2+ symmetric, toes mute Mini Mental State Exam 4/22/2019 10/24/2017 What is the Year 1 1 What is the Season 1 1 What is the Date 0 0 What is the Day 1 0 What is the Month 1 1 Where are we State 1 1 Where are we Country 1 1 Where are we [address] [place] [address] or [facility] 1 1 Where are we Floor 0 1 Name three objects, then ask the patient to say them 3 3 Serial sevens Subtract 7 from 100 in increments 5 5 Ask for the three objects repeated above 3 2 Name a pencil 1 1 Name a watch 1 1 Have the patient repeat this phrase \"No ifs, ands, or buts\" 1 1 Three stage command: Take the paper in your right hand 1 1 Fold the paper in half 1 1 Put the paper on the floor 1 1 Read and obey the following: CLOSE YOUR EYES 1 1 Have the patient write a sentence 1 1 Have the patient copy a figure 1 1 Mini Mental Score 28 27 Some recent data might be hidden Assessment/Plan Pt is an 81 y.o. right handed female with c/o memory loss and son concerned about her memory and in November, 2017 c/o burning in her feet to mid-leg for the previous 6-8 months, no numbness, no weakness, no LBP or radicular pain, now reporting the pain is only in her feet. No evidence of a large fiber neuropathy on NCS/EMG on 1/10/18 with Dr. Diamond Smith, may have early mild large fiber or small fiber neuropathy. B12 level was low 177. Exam with MMSE 28/30, normal gait.  
-Discussed memory loss and anxiety at length. Her MMSE score is stable 1.5 years later. Discussed the possibility of early dementia vs a pseudodementia due to anxiety as suggested by previous psychological evaluation. Recommend repeat neuropsychological testing to assess for decline and evidence of dementia. Will refer to Dr. Justen Rubin at [facility]. -Will hold off on starting medication for anxiety pending testing 
-Discussed effects of Vit B12 deficiency on cognitive function, mood, and sensory complaints. B12 level ordered. Instructed to restart B12 1000mcg daily, if she is not already taking this. -F/u after neuropsych testing, appt made for 4 months, instructed to call in the interim if needed. ICD-10-CM ICD-9-CM 1. Memory loss R41.3 780.93 VITAMIN B12  
   REFERRAL TO NEUROPSYCHOLOGY 2. Paresthesia of both lower extremities R20.2 782.0 3. B12 deficiency E53.8 266.2 VITAMIN B12 Signed: Deyanira Martines MD 
4/22/2019

## 2019-04-22 NOTE — PATIENT INSTRUCTIONS
A Healthy Lifestyle: Care Instructions Your Care Instructions A healthy lifestyle can help you feel good, stay at a healthy weight, and have plenty of energy for both work and play. A healthy lifestyle is something you can share with your whole family. A healthy lifestyle also can lower your risk for serious health problems, such as high blood pressure, heart disease, and diabetes. You can follow a few steps listed below to improve your health and the health of your family. Follow-up care is a key part of your treatment and safety. Be sure to make and go to all appointments, and call your doctor if you are having problems. It's also a good idea to know your test results and keep a list of the medicines you take. How can you care for yourself at home? · Do not eat too much sugar, fat, or fast foods. You can still have dessert and treats now and then. The goal is moderation. · Start small to improve your eating habits. Pay attention to portion sizes, drink less juice and soda pop, and eat more fruits and vegetables. ? Eat a healthy amount of food. A 3-ounce serving of meat, for example, is about the size of a deck of cards. Fill the rest of your plate with vegetables and whole grains. ? Limit the amount of soda and sports drinks you have every day. Drink more water when you are thirsty. ? Eat at least 5 servings of fruits and vegetables every day. It may seem like a lot, but it is not hard to reach this goal. A serving or helping is 1 piece of fruit, 1 cup of vegetables, or 2 cups of leafy, raw vegetables. Have an apple or some carrot sticks as an afternoon snack instead of a candy bar. Try to have fruits and/or vegetables at every meal. 
· Make exercise part of your daily routine. You may want to start with simple activities, such as walking, bicycling, or slow swimming. Try to be active 30 to 60 minutes every day.  You do not need to do all 30 to 60 minutes all at once. For example, you can exercise 3 times a day for 10 or 20 minutes. Moderate exercise is safe for most people, but it is always a good idea to talk to your doctor before starting an exercise program. 
· Keep moving. [place] the lawn, work in the garden, or [place]. Take the stairs instead of the elevator at work. · If you smoke, quit. People who smoke have an increased risk for heart attack, stroke, cancer, and other lung illnesses. Quitting is hard, but there are ways to boost your chance of quitting tobacco for good. ? Use nicotine gum, patches, or lozenges. ? Ask your doctor about stop-smoking programs and medicines. ? Keep trying. In addition to reducing your risk of diseases in the future, you will notice some benefits soon after you stop using tobacco. If you have shortness of breath or asthma symptoms, they will likely get better within a few weeks after you quit. · Limit how much alcohol you drink. Moderate amounts of alcohol (up to 2 drinks a day for men, 1 drink a day for women) are okay. But drinking too much can lead to liver problems, high blood pressure, and other health problems. Family health If you have a family, there are many things you can do together to improve your health. · Eat meals together as a family as often as possible. · Eat healthy foods. This includes fruits, vegetables, lean meats and dairy, and whole grains. · Include your family in your fitness plan. Most people think of activities such as jogging or tennis as the way to fitness, but there are many ways you and your family can be more active. Anything that makes you breathe hard and gets your heart pumping is exercise. Here are some tips: 
? Walk to do errands or to take your child to school or the bus. 
? Go for a family bike ride after dinner instead of watching TV. Where can you learn more? Go to http://janette-carolina.info/. Enter Q300 in the search box to learn more about \"A Healthy Lifestyle: Care Instructions. \" Current as of: September 11, 2018 Content Version: 11.9 © 1323-1703 [employer]. Care instructions adapted under license by [employer] (which disclaims liability or warranty for this information). If you have questions about a medical condition or this instruction, always ask your healthcare professional. [place] [address] any warranty or liability for your use of this information.

## 2019-04-22 NOTE — TELEPHONE ENCOUNTER
----- Message from Landry Trimble sent at 4/22/2019 10:19 AM EDT -----  Regarding: Dr. Billy Tabares  Pt's son(Allan Jiménez) called to give the list of pt's meds(\"Multivitamin', baby aspirin, \"Montelukast Sodium 10 mg once daily,\"Certazine(Allegra) as needed, vitamin D3 25 mg once daily, \"vitamin B12 once daily, \"Benzonatate\" 200 mg 3x daily, \"Alphalipoic\" Acid as needed, \"Chlorthalidone\" 25 mg 1/4 tablet every morning, Tums 1 daily, and  \"Furosemide\" 20 mg daily). Pt's son would like to know if there is any other medication pt should be taking. Best contact number [phone].

## 2019-04-22 NOTE — PROGRESS NOTES
Ms. Ena Massey presents today to follow up paresthesia of bilateral lower extremities. Her symptoms are slightly improved. Patient's son, Nikki Sanchez, is present and he reports patient has had a decline in her short-term memory. Depression screening done on this patient.

## 2019-04-22 NOTE — TELEPHONE ENCOUNTER
Brian Manzo - Please call pt's son. Thank him for updating his Mom's med list. I made the changes in her chart. She is not taking the anxiety medication that I prescribed in October. I will hold off on starting it for now and wait for the neuropsych test results. Have him make certain that she is taking Vit B12 1000mcg daily, her B12 level looks good.

## 2019-04-22 NOTE — LETTER
4/22/19 Patient: Andreina Perez YOB: 1935 Date of Visit: 4/22/2019 Hui Valles MD 
[place] [id number] VIA Facsimile: [phone] Dear Hui Valles MD, Thank you for referring Ms. Andreina Perez to [address] [place] [address] for evaluation. My notes for this consultation are attached. If you have questions, please do not hesitate to call me. I look forward to following your patient along with you. Sincerely, Steven Lee MD

## 2019-04-23 ENCOUNTER — DOCUMENTATION ONLY (OUTPATIENT)
Dept: NEUROLOGY | Age: 84
End: 2019-04-23

## 2019-04-23 LAB — VIT B12 SERPL-MCNC: 693 PG/ML (ref 232–1245)

## 2019-04-24 ENCOUNTER — TELEPHONE (OUTPATIENT)
Dept: NEUROLOGY | Age: 84
End: 2019-04-24

## 2019-04-24 RX ORDER — VENLAFAXINE HYDROCHLORIDE 37.5 MG/1
37.5 CAPSULE, EXTENDED RELEASE ORAL DAILY
Qty: 30 CAP | Refills: 5 | Status: SHIPPED | OUTPATIENT
Start: 2019-04-24 | End: 2019-06-19

## 2019-04-24 NOTE — TELEPHONE ENCOUNTER
* Message from Michelle below:      ----- Message from Cornelia Luna sent at 4/24/2019 11:18 AM EDT -----  Regarding: Dr. Nakia Fisher: [phone]  Pt's son is requesting a call back because his mom is a nervous wreck and he wanted to know if the doctor can prescribe something for her anxiety as she has done so in the past?

## 2019-04-25 NOTE — TELEPHONE ENCOUNTER
----- Message from Fela Harmon sent at 4/25/2019  9:43 AM EDT -----  Regarding: Dr Duffy/edd  Pt's son Hari Peoples is returning [address] call from this morning, please call [phone].

## 2019-04-25 NOTE — TELEPHONE ENCOUNTER
I informed patient's son medication was sent in. I reviewed possible side effects with him. He expressed understanding and agrees to monitor patient.

## 2019-06-05 ENCOUNTER — OFFICE VISIT (OUTPATIENT)
Dept: NEUROLOGY | Age: 84
End: 2019-06-05

## 2019-06-05 DIAGNOSIS — F03.90 MAJOR NEUROCOGNITIVE DISORDER (HCC): Primary | ICD-10-CM

## 2019-06-05 DIAGNOSIS — F32.9 REACTIVE DEPRESSION: ICD-10-CM

## 2019-06-05 DIAGNOSIS — F41.9 ANXIETY: ICD-10-CM

## 2019-06-05 NOTE — PROGRESS NOTES
This note will not be viewable in [address] [facility] at [address]    Office:  [phone]  Fax: [phone]                 Initial Office Exam  Patient Name: Orville Ibarra  Age: 81 y.o. Gender: female   Handedness: right handed   Presenting Dementia vs Depression  Primary Care Physician: Jose G Hebert MD  Referring Provider No ref. provider found      REASON FOR REFERRAL:  This comprehensive and medically necessary neuropsychological assessment was requested to assist a differential diagnosis of dementia vs pseudodementia. The use and purpose of this examination, as well as the extent and limitations of confidentiality, were explained prior to obtaining permission to participate. Instructions were provided regarding the necessity to put forth optimal effort and answer questions truthfully in order to obtain reliable and accurate test results. PERTINENT HISTORY:  Ms. Karlie Saenz presented for a neuropsychological assessment at the recommendation of her treating neurologist secondary to complaints of increasing increasing problems with her memory. Her daughter has reported symptoms that include has reported behaviors consistent with asking questions over and over trouble remembering recent events losing and misplacing things and what day it is guarding but not finishing things difficulty focusing on a task expressing anxiety and depression as well as feelings of hopelessness. Her daughter states that these behaviors have become progressively worse over the past 3 years and with the recent death of her  has exacerbated her mental decline. Ms. Karlie Saenz began noticing symptoms following following the death of her .   From a brief review of her medical and personal history there has not been any other significant neurological injury or illness noted or reported. She did not report experiencing depression or anxiety in the past.      Ms. Lucrecia Momin does not  report any problems at birth or difficulties meeting developmental milestones. She reports that she had an adequate level of family support and was not subject to trauma or abuse as a child. Ms. Lucrecia Momin does not  report being retain in school or receiving special assistance in any of She classes or subjects. Ms. Lucrecia Momin completing 15* years of education. Ms. Lucrecia Momin reports that there is a history of dementia, depression, and alcoholism in her family. Ms. Lucrecia Momin does not  exercise on a regular basis and does  maintain a balanced diet. She does  report problems with sleep and does not  complain of pain. She does not  participate in mentally stimulating activities. Ms. Lucrecia Momin does not  have concerns regarding prescription medications, family members, place of residence, or financial stressors. Ms. Lucrecia Momin indicated that she is not independent in her instrumental activities of daily living, including shopping, meal preparation, housekeeping, doing laundry, driving a car, managing medications, and finances. Current Outpatient Medications   Medication Sig    venlafaxine-SR (EFFEXOR-XR) 37.5 mg capsule Take 1 Cap by mouth daily.  Alpha Lipoic Acid 600 mg cap Take  by mouth.  cyanocobalamin (VITAMIN B12) 500 mcg tablet Take 1 Tab by mouth daily.  fexofenadine HCl (ALLEGRA PO) Take  by mouth.  furosemide (LASIX) 20 mg tablet as needed.  chlorthalidone (HYGROTEN) 25 mg tablet daily.  cholecalciferol (VITAMIN D3) 1,000 unit cap Take  by mouth daily.  benzonatate (TESSALON) 200 mg capsule Take 200 mg by mouth as needed for Cough.  calcium carbonate (TUMS) 300 mg (750 mg) chewable tablet Take 1 Tab by mouth daily.  montelukast (SINGULAIR) 10 mg tablet Take 1 Tab by mouth as needed.     aspirin 81 mg tablet Take 81 mg by mouth daily. No current facility-administered medications for this visit. Past Medical History:   Diagnosis Date    Arrhythmia     palpitations - no problems. 2/7/17: OCASSSIONALLY & BEING OBSERVED BY DR. Nellie Zavala. MITRAL VALVE REGURGITATION R/T RHUMATIC FEVER    H/O cataract     s/p extraction    Hearing loss     Has TM graft    History of poliomyelitis     Though had no symptoms, only tested positive.  Mitral regurgitation     Osteopenia     Rheumatic fever     AS A CHILD. Past Surgical History:   Procedure Laterality Date    HX APPENDECTOMY  1983    HX CHOLECYSTECTOMY      HX GI      COLONOSCOPY    HX HEENT      tonsillectomy    HX HEENT      BILATERAL CATARACT.  HX HYSTERECTOMY  1983    with oophorectomy    HX ORTHOPAEDIC      RIGHT KNEE ARTHROSCOPY WITH MINUSCUS REPAIR    HX ORTHOPAEDIC      LEFT KNEE ATHROSCOPY WITH MINUSCUS REPAIR.    HX OTHER SURGICAL  2001    colonoscopy    NEUROLOGICAL PROCEDURE UNLISTED      CERVICAL FUSION       Social History     Socioeconomic History    Marital status:      Spouse name: Not on file    Number of children: Not on file    Years of education: Not on file    Highest education level: Not on file   Occupational History    Occupation:    Tobacco Use    Smoking status: Never Smoker    Smokeless tobacco: Never Used   Substance and Sexual Activity    Alcohol use:  Yes     Alcohol/week: 4.2 oz     Types: 7 Glasses of wine per week     Comment: glass of wine or mixed drink - one per night    Drug use: No    Sexual activity: Not Currently   Social History Narrative    , lives in [place]. With        Family History   Problem Relation Age of Onset    Blindness Mother         Dec 89yo   [facility] Dementia Mother     Heart Attack Father         Dec 44yo    Heart Attack Sister         Dec 83yo    No Known Problems Son     No Known Problems Daughter        MRI Results (most recent):  No results found for this or any previous visit. MENTAL STATUS:    Orientation:  Oriented to day of week only   Eye Contact:  Appropriate   Motor Behavior:   Ambulated independently   Speech:   Fluent, no evidence of aphasia or dysarthria   Thought Process:  Goal-directed   Thought Content:  No evidence of hallucinations or delusions   Suicidal ideations:  None reported   Mood:   Dysphoric   Affect:   Restricted, tearful   Concentration:   Mildly decreased   Abstraction:   Within normal limits   Insight:   Limited     On the Modified Mini-Mental Status Exam: =71/100 (<1 %ile)      DIAGNOSTIC IMPRESSIONS:    ICD-10-CM ICD-9-CM    1. Major neurocognitive disorder F01.50 294.20    2. 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 Anxiety F41.9 300.00              PLAN:  1. Complete a comprehensive neuropsychological assessment to provide a differential diagnosis of presenting concerns as well as to assist with disposition and treatment planning as appropriate. 2. Consider compensatory and remedial cognitive training. 3. Consider psychotherapy for her grief reaction and depression. 4. Consider an adaptive driving evaluation. 5. Consider referral for elder health nurse to provide an in-home functional assessment. 6. Consider placement issues to provide greater structure and supervision to ensure safety, health and well-being. 09155 x 1 Review of records. Face to face interview w/ patient. Determine test protocol: 60 minutes. Total 1 unit        Reggie Galindo, PhD, ABPP, LCP  Licensed Clinical Psychologist/ Neuropsychologist        This note will not be viewable in [address]

## 2019-06-06 ENCOUNTER — TELEPHONE (OUTPATIENT)
Dept: NEUROLOGY | Age: 84
End: 2019-06-06

## 2019-06-10 ENCOUNTER — TELEPHONE (OUTPATIENT)
Dept: NEUROLOGY | Age: 84
End: 2019-06-10

## 2019-06-10 NOTE — TELEPHONE ENCOUNTER
Pt's PCP would like for her neuropsych eval and notes to be sent to their office please.         Dr. Jones See fax # [phone]

## 2019-06-19 ENCOUNTER — OFFICE VISIT (OUTPATIENT)
Dept: OBGYN CLINIC | Age: 84
End: 2019-06-19

## 2019-06-19 VITALS — BODY MASS INDEX: 17.94 KG/M2 | DIASTOLIC BLOOD PRESSURE: 64 MMHG | WEIGHT: 118 LBS | SYSTOLIC BLOOD PRESSURE: 110 MMHG

## 2019-06-19 DIAGNOSIS — Z01.419 ENCOUNTER FOR GYNECOLOGICAL EXAMINATION WITHOUT ABNORMAL FINDING: Primary | ICD-10-CM

## 2019-06-19 NOTE — PROGRESS NOTES
Annual exam ages 40-58 post hysterectomy    Keny Carty is a ,  81 y.o. female [facility] No LMP recorded. Patient has had a hysterectomy. .    She presents for her annual checkup. She is having no significant problems.  passed this year; active at her [employer] and with her family  Hormonal status:  She reports no perimenstrual type symptoms. She is not having vasomotor symptoms. The patient is not using any ERT. Sexual history:    She  reports that she does not currently engage in sexual activity. Medical conditions:    Since her last annual GYN exam about one year ago, she has not the following changes in her health history: none. Surgical history confirmed with patient. has a past surgical history that includes hx cholecystectomy; hx hysterectomy (); hx appendectomy (); pr neurological procedure unlisted; hx heent; hx heent; hx orthopaedic; hx orthopaedic; hx other surgical (); and hx gi. Pap and Mammogram History:    Her most recent Pap smear was normal, obtained several year(s) ago. .    The patient has not had a recent mammogram.    Breast Cancer History/Substance Abuse: negative    Osteoporosis History:    Family history does not include a first or second degree relative with osteopenia or osteoporosis. Past Medical History:   Diagnosis Date    Arrhythmia     palpitations - no problems. 18: OCASSSIONALLY & BEING OBSERVED BY DR. Divya Redd. MITRAL VALVE REGURGITATION R/T RHUMATIC FEVER    H/O cataract     s/p extraction    Hearing loss     Has TM graft    History of poliomyelitis     Though had no symptoms, only tested positive.  Mitral regurgitation     Osteopenia     Rheumatic fever     AS A CHILD. Past Surgical History:   Procedure Laterality Date    HX APPENDECTOMY      HX CHOLECYSTECTOMY      HX GI      COLONOSCOPY    HX HEENT      tonsillectomy    HX HEENT      BILATERAL CATARACT.    [address] [place] [address]    with oophorectomy    HX ORTHOPAEDIC      RIGHT KNEE ARTHROSCOPY WITH MINUSCUS REPAIR    HX ORTHOPAEDIC      LEFT KNEE ATHROSCOPY WITH MINUSCUS REPAIR.    HX OTHER SURGICAL  2001    colonoscopy    NEUROLOGICAL PROCEDURE UNLISTED      CERVICAL FUSION       Current Outpatient Medications   Medication Sig Dispense Refill    cyanocobalamin (VITAMIN B12) 500 mcg tablet Take 1 Tab by mouth daily.  fexofenadine HCl (ALLEGRA PO) Take  by mouth.  furosemide (LASIX) 20 mg tablet as needed.  cholecalciferol (VITAMIN D3) 1,000 unit cap Take  by mouth daily.  calcium carbonate (TUMS) 300 mg (750 mg) chewable tablet Take 1 Tab by mouth daily.  montelukast (SINGULAIR) 10 mg tablet Take 1 Tab by mouth as needed.  aspirin 81 mg tablet Take 81 mg by mouth daily.  venlafaxine-SR (EFFEXOR-XR) 37.5 mg capsule Take 1 Cap by mouth daily. 30 Cap 5    Alpha Lipoic Acid 600 mg cap Take  by mouth.  chlorthalidone (HYGROTEN) 25 mg tablet daily.  benzonatate (TESSALON) 200 mg capsule Take 200 mg by mouth as needed for Cough. Allergies: Latex; Amantadine; and Opioids - morphine analogues     Tobacco History:  reports that she has never smoked. She has never used smokeless tobacco.  Alcohol Abuse:  reports that she drinks about 4.2 oz of alcohol per week. Drug Abuse:  reports that she does not use drugs.     Family Medical/Cancer History:   Family History   Problem Relation Age of Onset    Blindness Mother         Dec 87yo   [facility] Dementia Mother     Heart Attack Father         Dec 44yo    Heart Attack Sister         Dec 83yo    No Known Problems Son     No Known Problems Daughter         Review of Systems - History obtained from the patient  Constitutional: negative for weight loss, fever, night sweats  HEENT: negative for hearing loss, earache, congestion, snoring, sorethroat  CV: negative for chest pain, palpitations, edema  Resp: negative for cough, shortness of breath, wheezing  GI: negative for change in bowel habits, abdominal pain, black or bloody stools  : negative for frequency, dysuria, hematuria, vaginal discharge  MSK: negative for back pain, joint pain, muscle pain  Breast: negative for breast lumps, nipple discharge, galactorrhea  Skin :negative for itching, rash, hives  Neuro: negative for dizziness, headache, confusion, weakness  Psych: negative for anxiety, depression, change in mood  Heme/lymph: negative for bleeding, bruising, pallor    Physical Exam    Visit Vitals  Wt 118 lb (53.5 kg)   BMI 17.94 kg/m²     Constitutional  · Appearance: well-nourished, well developed, alert, in no acute distress    HENT  · Head and Face: appears normal      Chest  · Respiratory Effort: breathing unlabored    Breasts  · Inspection of Breasts: breasts symmetrical, no skin changes, no discharge present, nipple appearance normal, no skin retraction present  · Palpation of Breasts and Axillae: no masses present on palpation, no breast tenderness  · Axillary Lymph Nodes: no lymphadenopathy present    Gastrointestinal  · Abdominal Examination: abdomen non-tender to palpation, normal bowel sounds, no masses present  · Liver and spleen: no hepatomegaly present, spleen not palpable  · Hernias: no hernias identified    Genitourinary  · External Genitalia: normal appearance for age, no discharge present, no tenderness present, no inflammatory lesions present, no masses present,  atrophy present  · Vagina: normal vaginal vault without central or paravaginal defects, no discharge present, no inflammatory lesions present, no masses present  · Bladder: non-tender to palpation  · Urethra: appears normal  · Cervix: absent  · Uterus: absent  · Adnexa: no adnexal tenderness present, no adnexal masses present  · Perineum: perineum within normal limits, no evidence of trauma, no rashes or skin lesions present  · Anus: anus within normal limits, no hemorrhoids present  · Inguinal Lymph Nodes: no lymphadenopathy present    Skin  · General Inspection: no rash, no lesions identified    Neurologic/Psychiatric  · Mental Status:  · Orientation: grossly oriented to person, place and time  · Mood and Affect: mood normal, affect appropriate    Assessment:  Routine gynecologic examination  Atrophic vaginitis  Her current medical status is satisfactory with no evidence of significant gynecologic issues.     Plan:  Pap not done today  Script for premarin cream  Counseled re: diet, exercise, healthy lifestyle  Return for yearly wellness visits  Rec annual mammogram

## 2019-07-05 ENCOUNTER — OFFICE VISIT (OUTPATIENT)
Dept: NEUROLOGY | Age: 84
End: 2019-07-05

## 2019-07-05 DIAGNOSIS — G31.84 MILD NEUROCOGNITIVE DISORDER: Primary | ICD-10-CM

## 2019-07-05 DIAGNOSIS — F43.21 UNRESOLVED GRIEF: ICD-10-CM

## 2019-07-05 NOTE — PROGRESS NOTES
This note will not be viewable in [address] [facility] at [address]    Office:  [phone]  Fax: [phone]                                             Neuropsychological Evaluation Report      Patient Name: Genesis Mandel  Age: 81 y.o. Gender: female   Handedness: right handed   Presenting Dementia vs Depression  Primary Care Physician: Mary Robledo MD  Referring Provider: Mariam Goldsmith MD       PATIENT HISTORY (OBTAINED DURING INITIAL CLINICAL EVALUATION):    REASON FOR REFERRAL:  This comprehensive and medically necessary neuropsychological assessment was requested to assist a differential diagnosis of dementia vs pseudodementia. The use and purpose of this examination, as well as the extent and limitations of confidentiality, were explained prior to obtaining permission to participate. Instructions were provided regarding the necessity to put forth optimal effort and answer questions truthfully in order to obtain reliable and accurate test results.     PERTINENT HISTORY:  Ms. Jeremy Jackson presented for a neuropsychological assessment at the recommendation of her treating neurologist secondary to complaints of increasing increasing problems with her memory. Her daughter has reported symptoms that include has reported behaviors consistent with asking questions over and over trouble remembering recent events losing and misplacing things and what day it is guarding but not finishing things difficulty focusing on a task expressing anxiety and depression as well as feelings of hopelessness. Her daughter states that these behaviors have become progressively worse over the past 3 years and with the recent death of her  has exacerbated her mental decline. Ms. Jeremy Jackson began noticing symptoms following following the death of her .   From a brief review of her medical and personal history there has not been any other significant neurological injury or illness noted or reported. She did not report experiencing depression or anxiety in the past.       Ms. Michelle Montero does not  report any problems at birth or difficulties meeting developmental milestones. She reports that she had an adequate level of family support and was not subject to trauma or abuse as a child. Ms. Michelle Montero does not  report being retain in school or receiving special assistance in any of She classes or subjects. Ms. Michelle Montero completing 15* years of education. Ms. Michelle Montero reports that there is a history of dementia, depression, and alcoholism in her family.     Ms. Michelle Montero does not  exercise on a regular basis and does  maintain a balanced diet. She does  report problems with sleep and does not  complain of pain. She does not  participate in mentally stimulating activities. Ms. Michelle Montero does not  have concerns regarding prescription medications, family members, place of residence, or financial stressors. Ms. Michelle Montero indicated that she is not independent in her instrumental activities of daily living, including shopping, meal preparation, housekeeping, doing laundry, driving a car, managing medications, and finances. METHODS OF ASSESSMENT (Current Evaluation):  Clinician Administered:  Clinical Interview  Review of Medical Records  Clock Drawing Task  Modified Mini-Mental Status Exam (3MS)  Hospital Anxiety and Depression Scale  Personality Assessment Inventory  Revised Memory and Behavior Checklist    Technician Administered:  D-KEFS:   Design Fluency  Hand Dynamometer Test  Miesha Dementia Rating Scale-2  NAB Judgment  RBANS-A  Reliable Digit Span  Test of Premorbid Functioning  [place] Functional Living Scale  Trail Making Test      TEST OBSERVATIONS:  Ms. Michelle Montero arrived promptly for the testing session. Dress and grooming were appropriate; physical presentation was unchanged from that observed during the clinical interview.   Speech was fluent, intelligible, and goal-directed. Affect was congruent with the euthymic mood conveyed. Ms. Shanique Henry was adequately cooperative and appeared to put forth good effort throughout this examination. Report with the examiner was adequately established and maintained. Minimal prompting was required. Comprehension of test instructions was not problematic. Performance motivation was objectively measured by two instruments (Reliable Digit Span, RBANS ES), and Ms. Shanique Henry produced normal scores on these measures. Accordingly, test findings below do not appear to be the product of disingenuous effort. Given the above observations, plus comments contained in the Mental Status section, the results of this examination are regarded as reasonably reliable and valid. TEST RESULTS:  Quantitative test results are derived from comparisons to age and education corrected cohort normative data, where applicable. Percentiles are included in these instances. Qualitative test results are determined using clinical observations.              General Orientation and Awareness:       Orientation to person: yes   Time: yes  Place: yes  Circumstance:yes               Awareness of deficits: decreased awareness                  Cognitive performance validity testing: Valid        Sensory-Perceptual and Motor Functioning:    Classification:   strength right dominant hand (16 percentile)              Low Average   strength left nondominant hand (18 percentile)   Low Average    Attention/Concentration:       Coding (50 percentile)           Average  Simple visuomotor tracking (2 percentile)               Moderately Impaired  Digits forward (69 percentile)                 Average  Digits backward (12 percentile)                 Low Average    Visuospatial and Constructional Praxis:     Figure copy (0.4 percentile)                                       Severely Impaired  Line orientation (15 percentile) Low Average     Language:         Picture naming (82 percentile)                               High Average  Semantic fluency (12 percentile)                    Low Average    Memory and Learning:       Immediate word list learning (2 percentile)               Moderately Impaired  Delayed word list recall (11 percentile)                  Low Average  Delayed word list recognition (< 0.1 percentile)               Severely Impaired  Immediate story memory (9 percentile)                  Low Average  Delayed story recall (0.4 percentile)                Severely Impaired  Delayed figure recall (0.3 percentile)                Severely Impaired    Cognitive Tests of Executive Functioning:     Ability to think flexibly, Trail Making B (0.2 percentile)               Severely Impaired  Simple judgment in daily decision making (16 percentile)              Low Average  Design Fluency (91 percentile)        High Average     Miesha Dementia Rating Scale - 2:        Scale                           SS                   Percentile  Attention                             10                         59                                      Average  Initiation/Perseveration      13                          89                                      High Average  Construction                      10                          59                                     Average  Conceptualization              11                          71                                     Average  Memory                              2                           < 1                                    Severely Impaired  Total                                   6                            9                                       Low Average    Adaptive Behavioral Measure of Daily Functioning:   Time:    75 %ile   Memory/calculations:   25 %ile  Communication:    9 %ile   Memory:     9 %ile    Total:     T= 33 (5%ile)      Intellectual and Basic Cognitive Functioning (WAIS-IV):  ACS Test of pre-morbid functioning reading recognition lower limit estimated WAIS-IV FSIQ score:       Estimated full scale IQ:            52 percentile     Average    Emotional Functioning:  Ms. Hayden Zavala was administered the STEW to assess her current level of emotional functioning. Based on indicators within this measure there is a strong suggestion that Ms. Mario Anderson may not have answered in a completely forthright matter. This is likely to lead to a somewhat inaccurate impression based upon the style of her responding. Her pattern of responses suggest that she tends to portray her portray herself as being exceptionally free of common shortcomings to which most individuals will admit. She may be blindly uncritical to her own behavior and sensitive to the negative consequences associated with her behavior tending to minimize the negative impact that her behavior has on others and herself. Given the high a level of defensiveness, the results of this particular test are not likely to be valid. In general, her clinical profile reveals no marked elevations that should be considered to suggest the presence of a clinical psychopathology. IMPRESSIONS:  Ms. Mario Anderson was administered a neuropsychological evaluation to ascertain her current level of neurocognitive ability. She was administered a battery of test that assessed her attention, visual-spatial, language, memory, and executive functioning. Her overall level of functioning across the various neurocognitive domains indicates a mild impairment at this time. She initially was administered a measure of basic cognitive ability she performed in the low average range. The most significant finding on this measure was her severely impaired memory. She was then administered a slightly more demanding measure of her functioning to get a broader sampling of her abilities.   At the domain level, her scores ranged from significantly impaired to average. Considerable variability was noted on individual measures within each of the domains, as such domain level scores are likely to be the most reliable measure of her current functioning. On measures of attention and language functioning she performed in the average range. In contrast, she was significantly impaired on a measure of constructional apraxia and in the low average range on her visual perception of line orientation. This yielded a domain score for spatial functioning in the moderately impaired range. On measures of memory functioning, she was significantly impaired on both verbal and visual measures both at acquisition and delayed recall. She was also administered measures of visual fluency, cognitive flexibility, and everyday judgment. While her overall score was in the low average range, she demonstrated marked impairment on a measure of cognitive flexibility (I.e. shifting cognitive sets). In summary, findings are consistent with an individual who is likely experiencing significant changes in her day-to-day functioning consistent with her daughter's report. This is further supported by a decrease in her ability on a measure of adaptive behavioral daily functioning, which overall was in the mildly impaired range. This represents a significant decline from her estimated premorbid level of functioning. DIAGNOSTIC IMPRESSIONS:    ICD-10-CM ICD-9-CM    1. 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 Unresolved grief F43.21 309.1          RECOMMENDATIONS:   1. Findings should be reviewed with Ms. Nicole Malone to insure her understanding and discuss the potential implications. 2. Emphasis should be placed on Ms. Nicole Malone obtaining good sleep hygiene and maintaining adequate physical exercise to promote good brain health. 3. Ms. Nicole Malone may benefit from a referral to psychotherapy to address ongoing grief issues.  Her daughter is warned to be mindful of the increased risk of depression, which is known to negatively impact cognition. 4. Ms. Norm Boogie is encouraged to seek out various forms of mental stimulation that would help to \"exercise\" her brain. This might include learning a new skill, hobby, travel, attending lectures, or evening reading or listening to podcasts or videos on topics of her interest.   5.Participating in a variety of activities with other adults is likely serve as a positive distraction. I would encourage participation in support groups in her area. Thank you for allowing me the opportunity to assist you in Ms. Jiménez's care. Please do not hesitate to contact me should you have additional questions that I may not have addressed. 56407 x 1  96138 x 1  96139 x 6  96132 x 1  96133 x 2          Alisha Vasquez, Ph.D., ABPP  Licensed Clinical Psychologist  Neuropsychologist  Board Certified Rehabilitation Psychologist      Time Documentation:     62866 x 1: Neurobehavioral Status Exam/Clinical Interview: (1 hour, (already billed on first date of service)     67533*5 Neuropsych testing/data gathering by Neuropsychologist (35 additional minutes, see above)      96138 x 1  96139 x 6 Test Administration/Data Gathering By Technician: (3.5 hours). 97099 x 1 (first 30 minutes), 75147 x 7 (each additional 30 minutes)     96132 x 1  96133 x 1 Testing Evaluation Services by Neuropsychologist (1 hour, 50 minutes) 96132 x 1 (first hour), 96133 x 1 (50 minutes)     Definitions:       15198/14735:  Neurobehavioral Status Exam, Clinical interview. Clinical assessment of thinking, reasoning and judgment, by neuropsychologist, both face to face time with patient and time interpreting those test results and reporting, first and subsequent hours)     99445/11389: Neuropsychological Test Administration by Technician/Psychometrist, first 30 minutes and each additional 30 minutes. The above includes: Record review. Review of history provided by patient.   Review of collaborative information. Testing by Clinician. Review of raw data. Scoring. Report writing of individual tests administered by Clinician. Integration of individual tests administered by psychometrist with NSE/testing by clinician, review of records/history/collaborative information, case Conceptualization, treatment planning, clinical decision making, report writing, coordination Of Care. Psychometry test codes as time spent by psychometrist administering and scoring neurocognitive/psychological tests under supervision of neuropsychologist.  Integral services including scoring of raw data, data interpretation, case conceptualization, report writing etcetera were initiated after the patient finished testing/raw data collected and was completed on the date the report was signed. This note will not be viewable in [address]

## 2019-07-05 NOTE — LETTER
7/8/19 Patient: Narendra German YOB: 1935 Date of Visit: 7/5/2019 Aly Cuellar MD 
[address] [address] VIA Facsimile: [phone] Dear Aly Cuellar MD, Thank you for referring Ms. Narendra German to [address] [place] [address] for evaluation. My notes for this consultation are attached. If you have questions, please do not hesitate to call me. I look forward to following your patient along with you.  
 
 
Sincerely, 
 
Darian Velarde, PHD

## 2019-07-08 NOTE — PATIENT INSTRUCTIONS
Recovering From Depression: Care Instructions Your Care Instructions Taking good care of yourself is important as you recover from depression. In time, your symptoms will fade as your treatment takes hold. Do not give up. Instead, focus your energy on getting better. Your mood will improve. It just takes some time. Focus on things that can help you feel better, such as being with friends and family, eating well, and getting enough rest. But take things slowly. Do not do too much too soon. You will begin to feel better gradually. Follow-up care is a key part of your treatment and safety. Be sure to make and go to all appointments, and call your doctor if you are having problems. It's also a good idea to know your test results and keep a list of the medicines you take. How can you care for yourself at home? Be realistic · If you have a large task to do, break it up into smaller steps you can handle, and just do what you can. · You may want to put off important decisions until your depression has lifted. If you have plans that will have a major impact on your life, such as marriage, divorce, or a job change, try to wait a bit. Talk it over with friends and loved ones who can help you look at the overall picture first. 
· Reaching out to people for help is important. Do not isolate yourself. Let your family and friends help you. Find someone you can trust and confide in, and talk to that person. · Be patient, and be kind to yourself. Remember that depression is not your fault and is not something you can overcome with willpower alone. Treatment is necessary for depression, just like for any other illness. Feeling better takes time, and your mood will improve little by little. Stay active · Stay busy and get outside. Take a walk, or try some other light exercise. · Talk with your doctor about an exercise program. Exercise can help with mild depression. · Go to a movie or concert. Take part in a [employer] activity or other social gathering. Go to a ball game. · Ask a friend to have dinner with you. Take care of yourself · Eat a balanced diet with plenty of fresh fruits and vegetables, whole grains, and lean protein. If you have lost your appetite, eat small snacks rather than large meals. · Avoid drinking alcohol or using illegal drugs. Do not take medicines that have not been prescribed for you. They may interfere with medicines you may be taking for depression, or they may make your depression worse. · Take your medicines exactly as they are prescribed. You may start to feel better within 1 to 3 weeks of taking antidepressant medicine. But it can take as many as 6 to 8 weeks to see more improvement. If you have questions or concerns about your medicines, or if you do not notice any improvement by 3 weeks, talk to your doctor. · If you have any side effects from your medicine, tell your doctor. Antidepressants can make you feel tired, dizzy, or nervous. Some people have dry mouth, constipation, headaches, sexual problems, or diarrhea. Many of these side effects are mild and will go away on their own after you have been taking the medicine for a few weeks. Some may last longer. Talk to your doctor if side effects are bothering you too much. You might be able to try a different medicine. · Get enough sleep. If you have problems sleeping: 
? Go to bed at the same time every night, and get up at the same time every morning. ? Keep your bedroom dark and quiet. ? Do not exercise after 5:00 p.m. ? Avoid drinks with caffeine after 5:00 p.m. · Avoid sleeping pills unless they are prescribed by the doctor treating your depression. Sleeping pills may make you groggy during the day, and they may interact with other medicine you are taking.  
· If you have any other illnesses, such as diabetes, heart disease, or high blood pressure, make sure to continue with your treatment. Tell your doctor about all of the medicines you take, including those with or without a prescription. · Keep the numbers for these national suicide hotlines: 9-857-637-TALK (9-724.746.8345) and 1-646-CTBWZHY (2-548.980.7772). If you or someone you know talks about suicide or feeling hopeless, get help right away. When should you call for help? Call 911 anytime you think you may need emergency care. For example, call if: 
  · You feel like hurting yourself or someone else.  
  · Someone you know has depression and is about to attempt or is attempting suicide.  
[facility] your doctor now or seek immediate medical care if: 
  · You hear voices.  
  · Someone you know has depression and: 
? Starts to give away his or her possessions. ? Uses illegal drugs or drinks alcohol heavily. ? Talks or writes about death, including writing suicide notes or talking about guns, knives, or pills. ? Starts to spend a lot of time alone. ? Acts very aggressively or suddenly appears calm.  
 Watch closely for changes in your health, and be sure to contact your doctor if: 
  · You do not get better as expected. Where can you learn more? Go to http://janette-carolina.info/. Enter H174 in the search box to learn more about \"Recovering From Depression: Care Instructions. \" Current as of: September 11, 2018 Content Version: 11.9 © 2443-2870 [employer]. Care instructions adapted under license by [employer] (which disclaims liability or warranty for this information). If you have questions about a medical condition or this instruction, always ask your healthcare professional. [place] [address] any warranty or liability for your use of this information. Anxiety Disorder: Care Instructions Your Care Instructions Anxiety is a normal reaction to stress.  Difficult situations can cause you to have symptoms such as sweaty palms and a nervous feeling. In an anxiety disorder, the symptoms are far more severe. Constant worry, muscle tension, trouble sleeping, nausea and diarrhea, and other symptoms can make normal daily activities difficult or impossible. These symptoms may occur for no reason, and they can affect your work, school, or social life. Medicines, counseling, and self-care can all help. Follow-up care is a key part of your treatment and safety. Be sure to make and go to all appointments, and call your doctor if you are having problems. It's also a good idea to know your test results and keep a list of the medicines you take. How can you care for yourself at home? · Take medicines exactly as directed. Call your doctor if you think you are having a problem with your medicine. · Go to your counseling sessions and follow-up appointments. · Recognize and accept your anxiety. Then, when you are in a situation that makes you anxious, say to yourself, \"This is not an emergency. I feel uncomfortable, but I am not in danger. I can keep going even if I feel anxious. \" · Be kind to your body: 
? Relieve tension with exercise or a massage. ? Get enough rest. 
? Avoid alcohol, caffeine, nicotine, and illegal drugs. They can increase your anxiety level and cause sleep problems. ? Learn and do relaxation techniques. See below for more about these techniques. · Engage your mind. Get out and do something you enjoy. Go to a funny movie, or take a walk or hike. Plan your day. Having too much or too little to do can make you anxious. · Keep a record of your symptoms. Discuss your fears with a good friend or family member, or join a support group for people with similar problems. Talking to others sometimes relieves stress. · Get involved in social groups, or volunteer to help others. Being alone sometimes makes things seem worse than they are. · Get at least 30 minutes of exercise on most days of the week to relieve stress. Walking is a good choice. You also may want to do other activities, such as running, swimming, cycling, or playing tennis or team sports. Relaxation techniques Do relaxation exercises 10 to 20 minutes a day. You can play soothing, relaxing music while you do them, if you wish. · Tell others in your house that you are going to do your relaxation exercises. Ask them not to disturb you. · Find a comfortable place, away from all distractions and noise. · Lie down on your back, or sit with your back straight. · Focus on your breathing. Make it slow and steady. · Breathe in through your nose. Breathe out through either your nose or mouth. · Breathe deeply, filling up the area between your navel and your rib cage. Breathe so that your belly goes up and down. · Do not hold your breath. · Breathe like this for 5 to 10 minutes. Notice the feeling of calmness throughout your whole body. As you continue to breathe slowly and deeply, relax by doing the following for another 5 to 10 minutes: · Tighten and relax each muscle group in your body. You can begin at your toes and work your way up to your head. · Imagine your muscle groups relaxing and becoming heavy. · Empty your mind of all thoughts. · Let yourself relax more and more deeply. · Become aware of the state of calmness that surrounds you. · When your relaxation time is over, you can bring yourself back to alertness by moving your fingers and toes and then your hands and feet and then stretching and moving your entire body. Sometimes people fall asleep during relaxation, but they usually wake up shortly afterward. · Always give yourself time to return to full alertness before you drive a car or do anything that might cause an accident if you are not fully alert. Never play a relaxation tape while you drive a car. When should you call for help? Call 911 anytime you think you may need emergency care. For example, call if: 
  · You feel you cannot stop from hurting yourself or someone else.  
Jimmie Shallow the numbers for these national suicide hotlines: 5-795-332-TALK (4-371.633.8797) and 8-422-CBGRONK (8-930.294.5366). If you or someone you know talks about suicide or feeling hopeless, get help right away. 
 Watch closely for changes in your health, and be sure to contact your doctor if: 
  · You have anxiety or fear that affects your life.  
  · You have symptoms of anxiety that are new or different from those you had before. Where can you learn more? Go to http://janette-carolina.info/. Enter P754 in the search box to learn more about \"Anxiety Disorder: Care Instructions. \" Current as of: September 11, 2018 Content Version: 11.9 © 1606-0082 [employer]. Care instructions adapted under license by [employer] (which disclaims liability or warranty for this information). If you have questions about a medical condition or this instruction, always ask your healthcare professional. [address] any warranty or liability for your use of this information.

## 2019-07-09 ENCOUNTER — TELEPHONE (OUTPATIENT)
Dept: NEUROLOGY | Age: 84
End: 2019-07-09

## 2019-07-10 ENCOUNTER — TELEPHONE (OUTPATIENT)
Dept: NEUROLOGY | Age: 84
End: 2019-07-10

## 2019-07-10 NOTE — TELEPHONE ENCOUNTER
Pt's son calling needing to speak to Dr. Steve Gutiérrez about Dr. Shon Dallas results, and what the next steps are.  Please call back

## 2019-07-11 ENCOUNTER — TELEPHONE (OUTPATIENT)
Dept: NEUROLOGY | Age: 84
End: 2019-07-11

## 2019-07-11 NOTE — TELEPHONE ENCOUNTER
Reached out to Adeline Alvarez to advise of Dr. Yayo Conley' response. He will try to find a different facility because the one she currently likes does not have a memory unit.

## 2019-07-11 NOTE — TELEPHONE ENCOUNTER
----- Message from Heather Martinez, PHD sent at 7/10/2019  6:50 PM EDT -----  Regarding: RE: Pt's son has a question regarding type of home that pt needs  Contact: [phone]  Zakiya,    Pt would likely benefit from assisted living facility, but may not be requiring a Memory Unit yet. This is likely something for the future, but probably premature for now.   ----- Message -----  From: Jojo Hernandez  Sent: 7/10/2019   3:36 PM  To: Heather Martinez, PHD  Subject: Pt's son has a question regarding type of ho#    Lynsey Nuñezinna, pt's son called trying to get some information about testing appt. Pt is scheduled for follow up on 07/18/19 but pt's children do not live in [place]. Son wanted to make sure the type of Home the pt needs to go and was asking if pt needs a skilled care facility or a facility that focusing on memory? Please advise and I can reach back out to . Hyacinth Blair to let him know.

## 2019-07-12 NOTE — TELEPHONE ENCOUNTER
Well, he discussed this with Dr. Dom Silverio' office as well. There is a chance this could progress, but it may not. According to note from Dr. Dom Silverio, she is does not need memory care currently, but might in the future.

## 2019-07-12 NOTE — TELEPHONE ENCOUNTER
Isabelle Moran - Please call pt's son. I see that he has spoken with Dr. Carlos Velez' office as well. She has f/u with me in a few weeks to discuss results in detail and next steps. There is nothing urgent that needs to be done at this time.

## 2019-07-12 NOTE — TELEPHONE ENCOUNTER
Dr. Denys Nunez, I spoke with patient's son and his concern is that he has signed a contract on an Assisted Living facility that does not have a memory care unit. He has one week to get out of this contract. Please advise.

## 2019-07-18 ENCOUNTER — OFFICE VISIT (OUTPATIENT)
Dept: NEUROLOGY | Age: 84
End: 2019-07-18

## 2019-07-18 DIAGNOSIS — F32.9 REACTIVE DEPRESSION: ICD-10-CM

## 2019-07-18 DIAGNOSIS — F43.21 UNRESOLVED GRIEF: ICD-10-CM

## 2019-07-18 DIAGNOSIS — G31.84 MILD NEUROCOGNITIVE DISORDER: Primary | ICD-10-CM

## 2019-07-18 NOTE — LETTER
7/18/19 Patient: Palma Zaragoza YOB: 1935 Date of Visit: 7/18/2019 Toya Boswell MD 
[address] [address] VIA Facsimile: [phone] Dear Toya Boswell MD, Thank you for referring Ms. Palma Zaragoza to [address] [place] [address] for evaluation. My notes for this consultation are attached. If you have questions, please do not hesitate to call me. I look forward to following your patient along with you.  
 
 
Sincerely, 
 
Kaiser Landry, PHD

## 2019-07-18 NOTE — PROGRESS NOTES
This note will not be viewable in [address] Sweetie Miller is a 81 y.o. female who presents today for feedback following recent neuropsychological testing. The results were reviewed of the recent neuropsychological evaluation, including discussing individual tests as well as the patient's areas of neurocognitive strength versus their weaknesses. Education was provided regarding my diagnostic impressions, and we discussed next steps for further evaluation down the road. I all also answered numerous questions related to the clinical findings, including discussing various methods to improve her cognition and mood when relevant. The patient is encouraged to follow-up with the referring provider. It is critical for her to get her depression/grief under control as it is undermining her ability to function independently.         [id number] 45 minutes x 1    Zoraida Quintana, PHD

## 2019-07-18 NOTE — PATIENT INSTRUCTIONS
Anxiety Disorder: Care Instructions  Your Care Instructions    Anxiety is a normal reaction to stress. Difficult situations can cause you to have symptoms such as sweaty palms and a nervous feeling. In an anxiety disorder, the symptoms are far more severe. Constant worry, muscle tension, trouble sleeping, nausea and diarrhea, and other symptoms can make normal daily activities difficult or impossible. These symptoms may occur for no reason, and they can affect your work, school, or social life. Medicines, counseling, and self-care can all help. Follow-up care is a key part of your treatment and safety. Be sure to make and go to all appointments, and call your doctor if you are having problems. It's also a good idea to know your test results and keep a list of the medicines you take. How can you care for yourself at home? · Take medicines exactly as directed. Call your doctor if you think you are having a problem with your medicine. · Go to your counseling sessions and follow-up appointments. · Recognize and accept your anxiety. Then, when you are in a situation that makes you anxious, say to yourself, \"This is not an emergency. I feel uncomfortable, but I am not in danger. I can keep going even if I feel anxious. \"  · Be kind to your body:  ? Relieve tension with exercise or a massage. ? Get enough rest.  ? Avoid alcohol, caffeine, nicotine, and illegal drugs. They can increase your anxiety level and cause sleep problems. ? Learn and do relaxation techniques. See below for more about these techniques. · Engage your mind. Get out and do something you enjoy. Go to a funny movie, or take a walk or hike. Plan your day. Having too much or too little to do can make you anxious. · Keep a record of your symptoms. Discuss your fears with a good friend or family member, or join a support group for people with similar problems. Talking to others sometimes relieves stress.   · Get involved in social groups, or volunteer to help others. Being alone sometimes makes things seem worse than they are. · Get at least 30 minutes of exercise on most days of the week to relieve stress. Walking is a good choice. You also may want to do other activities, such as running, swimming, cycling, or playing tennis or team sports. Relaxation techniques  Do relaxation exercises 10 to 20 minutes a day. You can play soothing, relaxing music while you do them, if you wish. · Tell others in your house that you are going to do your relaxation exercises. Ask them not to disturb you. · Find a comfortable place, away from all distractions and noise. · Lie down on your back, or sit with your back straight. · Focus on your breathing. Make it slow and steady. · Breathe in through your nose. Breathe out through either your nose or mouth. · Breathe deeply, filling up the area between your navel and your rib cage. Breathe so that your belly goes up and down. · Do not hold your breath. · Breathe like this for 5 to 10 minutes. Notice the feeling of calmness throughout your whole body. As you continue to breathe slowly and deeply, relax by doing the following for another 5 to 10 minutes:  · Tighten and relax each muscle group in your body. You can begin at your toes and work your way up to your head. · Imagine your muscle groups relaxing and becoming heavy. · Empty your mind of all thoughts. · Let yourself relax more and more deeply. · Become aware of the state of calmness that surrounds you. · When your relaxation time is over, you can bring yourself back to alertness by moving your fingers and toes and then your hands and feet and then stretching and moving your entire body. Sometimes people fall asleep during relaxation, but they usually wake up shortly afterward. · Always give yourself time to return to full alertness before you drive a car or do anything that might cause an accident if you are not fully alert.  Never play a relaxation tape while you drive a car. When should you call for help? Call 911 anytime you think you may need emergency care. For example, call if:    · You feel you cannot stop from hurting yourself or someone else.   Heena Galvin the numbers for these national suicide hotlines: 8-232-138-TALK (9-688.582.1305) and 1-795-KWFXQUP (4-466.450.9819). If you or someone you know talks about suicide or feeling hopeless, get help right away.   Watch closely for changes in your health, and be sure to contact your doctor if:    · You have anxiety or fear that affects your life.     · You have symptoms of anxiety that are new or different from those you had before. Where can you learn more? Go to http://janette-carolina.info/. Enter P754 in the search box to learn more about \"Anxiety Disorder: Care Instructions. \"  Current as of: September 11, 2018  Content Version: 11.9  © 8979-2809 [facility]. Care instructions adapted under license by [employer] (which disclaims liability or warranty for this information). If you have questions about a medical condition or this instruction, always ask your healthcare professional. [address] any warranty or liability for your use of this information. Recovering From Depression: Care Instructions  Your Care Instructions    Taking good care of yourself is important as you recover from depression. In time, your symptoms will fade as your treatment takes hold. Do not give up. Instead, focus your energy on getting better. Your mood will improve. It just takes some time. Focus on things that can help you feel better, such as being with friends and family, eating well, and getting enough rest. But take things slowly. Do not do too much too soon. You will begin to feel better gradually. Follow-up care is a key part of your treatment and safety. Be sure to make and go to all appointments, and call your doctor if you are having problems. It's also a good idea to know your test results and keep a list of the medicines you take. How can you care for yourself at home? Be realistic  · If you have a large task to do, break it up into smaller steps you can handle, and just do what you can. · You may want to put off important decisions until your depression has lifted. If you have plans that will have a major impact on your life, such as marriage, divorce, or a job change, try to wait a bit. Talk it over with friends and loved ones who can help you look at the overall picture first.  · Reaching out to people for help is important. Do not isolate yourself. Let your family and friends help you. Find someone you can trust and confide in, and talk to that person. · Be patient, and be kind to yourself. Remember that depression is not your fault and is not something you can overcome with willpower alone. Treatment is necessary for depression, just like for any other illness. Feeling better takes time, and your mood will improve little by little. Stay active  · Stay busy and get outside. Take a walk, or try some other light exercise. · Talk with your doctor about an exercise program. Exercise can help with mild depression. · Go to a movie or concert. Take part in a Rastafarian activity or other social gathering. Go to a ball game. · Ask a friend to have dinner with you. Take care of yourself  · Eat a balanced diet with plenty of fresh fruits and vegetables, whole grains, and lean protein. If you have lost your appetite, eat small snacks rather than large meals. · Avoid drinking alcohol or using illegal drugs. Do not take medicines that have not been prescribed for you. They may interfere with medicines you may be taking for depression, or they may make your depression worse. · Take your medicines exactly as they are prescribed. You may start to feel better within 1 to 3 weeks of taking antidepressant medicine.  But it can take as many as 6 to 8 weeks to see more improvement. If you have questions or concerns about your medicines, or if you do not notice any improvement by 3 weeks, talk to your doctor. · If you have any side effects from your medicine, tell your doctor. Antidepressants can make you feel tired, dizzy, or nervous. Some people have dry mouth, constipation, headaches, sexual problems, or diarrhea. Many of these side effects are mild and will go away on their own after you have been taking the medicine for a few weeks. Some may last longer. Talk to your doctor if side effects are bothering you too much. You might be able to try a different medicine. · Get enough sleep. If you have problems sleeping:  ? Go to bed at the same time every night, and get up at the same time every morning. ? Keep your bedroom dark and quiet. ? Do not exercise after 5:00 p.m.  ? Avoid drinks with caffeine after 5:00 p.m. · Avoid sleeping pills unless they are prescribed by the doctor treating your depression. Sleeping pills may make you groggy during the day, and they may interact with other medicine you are taking. · If you have any other illnesses, such as diabetes, heart disease, or high blood pressure, make sure to continue with your treatment. Tell your doctor about all of the medicines you take, including those with or without a prescription. · Keep the numbers for these national suicide hotlines: 8-371-466-TALK (3-435.497.8770) and 3-423-BWRCDBQ (2-123.658.5549). If you or someone you know talks about suicide or feeling hopeless, get help right away. When should you call for help? Call 911 anytime you think you may need emergency care.  For example, call if:    · You feel like hurting yourself or someone else.     · Someone you know has depression and is about to attempt or is attempting suicide.    Call your doctor now or seek immediate medical care if:    · You hear voices.     · Someone you know has depression and:  ? Starts to give away his or her possessions. ? Uses illegal drugs or drinks alcohol heavily. ? Talks or writes about death, including writing suicide notes or talking about guns, knives, or pills. ? Starts to spend a lot of time alone. ? Acts very aggressively or suddenly appears calm.    Watch closely for changes in your health, and be sure to contact your doctor if:    · You do not get better as expected. Where can you learn more? Go to http://janette-carolina.info/. Enter V771 in the search box to learn more about \"Recovering From Depression: Care Instructions. \"  Current as of: September 11, 2018  Content Version: 11.9  © 3816-5139 [employer]. Care instructions adapted under license by [employer] (which disclaims liability or warranty for this information). If you have questions about a medical condition or this instruction, always ask your healthcare professional. [address] any warranty or liability for your use of this information.

## 2019-07-24 ENCOUNTER — TELEPHONE (OUTPATIENT)
Dept: NEUROLOGY | Age: 84
End: 2019-07-24

## 2019-07-24 NOTE — TELEPHONE ENCOUNTER
Remi Dumont called back with an additional question. He wanted to know if there is a guideline or indicator to follow to let them know when or if to take pt to hospital to get a psych eval due to her suicidal thoughts?

## 2019-07-24 NOTE — TELEPHONE ENCOUNTER
Patient's son, Rocky Abreu, called and reported patient stopped her Venlafaxine two weeks ago. She restarted it one week ago. Over the past few days she told her son several times that she wants to die. She stated she planned to crawl up under her house and die or just walk out into the woods to die. I advised that her son take her to the ED immediately for evaluation. He agreed to this plan.

## 2019-07-24 NOTE — TELEPHONE ENCOUNTER
Called Ruth Rod back and he wanted to provide you with some updates on pt since her follow up with you on 07/18/19. Scout Richard said that pt has taken progressive step in a negative direction. He said that her attitude has changed, outlook on life is different and pt feels there is no reason to live and has said she wants to die. He said that the pt has given them illogical ways to commit suicide but she is coming up with ways and that concerns him. He said pt is very angry/irritated with a little bit of violence (pt throwing dishes). He said after the appt with you they met Dr. Callum Arauz and reduced anti-anxiety meds by 1/2 dosage. He said all issues have happened since meeting up for the follow up. He said on 07/23/19 pt was reading in living room and just screamed and then deliberately hit her head against the wall. Pt is now in [address] [place] [address] and will be moving into home within the next couple of weeks and is staying with daughter until then. They are concerned because the apartment she will be living in is considered \"Independent living\" and they feel that is not a good option for pt. They are wanting any recommendation from you. They did also update Dr. Callum Arauz and Eduardo Orozco as well and he wasn't sure if you wanted to speak with them. Scout Richard is also asking if you know of any Neurologist in the [facility] that you might recommend for pt to see, he said it might be a long shot but wanted to ask just in case. Was not sure if you wanted to reach out to Dr. Callum Arauz or Dr. Eduardo Orozco to see their take or provide feedback. Let me know and I can update Justo.     Justo's best contact is [phone]

## 2019-07-24 NOTE — TELEPHONE ENCOUNTER
----- Message from Seema Winn sent at 7/24/2019  2:18 PM EDT -----  Regarding: Dr Jernigan/telephone  Pt son Pao Sal would like to speak to Wes Vicente one more time, please call son at [phone].

## 2019-08-14 ENCOUNTER — TELEPHONE (OUTPATIENT)
Dept: NEUROLOGY | Age: 84
End: 2019-08-14

## 2019-08-14 NOTE — TELEPHONE ENCOUNTER
Called daughter back and explained that provider reached out to brothinna Henry and provided updates regarding pts suicidal thoughts. Requested she reach out to josué at this time to get any updated that Dr. Alvaro Vickers gave.

## 2019-08-14 NOTE — TELEPHONE ENCOUNTER
Received call from patient's daughter (listed on HIPAA) she would like a call back from Dr. Phyllis Hughes regarding some concerns that she is having. She stated that her mother told someone that she wanted to kill herself.       [phone]

## 2019-11-04 ENCOUNTER — TELEPHONE (OUTPATIENT)
Dept: NEUROLOGY | Age: 84
End: 2019-11-04

## 2019-11-04 NOTE — TELEPHONE ENCOUNTER
[facility] medical records fax. No release on file, called [phone] and left v/m for release to be faxed and for me to be called if needed.

## 2019-11-06 ENCOUNTER — TELEPHONE (OUTPATIENT)
Dept: NEUROLOGY | Age: 84
End: 2019-11-06

## 2019-11-06 NOTE — TELEPHONE ENCOUNTER
Rcvd fax from [id number] [facility],[address] center for medical records. Faxed to [facility] [facility]. Fax conf to chart.

## 2024-02-04 NOTE — PATIENT INSTRUCTIONS

## (undated) DEVICE — WIPE 400300 MEROCEL 20PK INSTRUMENT: Brand: MEROCEL®

## (undated) DEVICE — DEVON™ KNEE AND BODY STRAP 60" X 3" (1.5 M X 7.6 CM): Brand: DEVON

## (undated) DEVICE — TRAY PREP DRY W/ PREM GLV 2 APPL 6 SPNG 2 UNDPD 1 OVERWRAP

## (undated) DEVICE — KENDALL SCD EXPRESS SLEEVES, KNEE LENGTH, MEDIUM: Brand: KENDALL SCD

## (undated) DEVICE — GOWN,SIRUS,FABRNF,XL,20/CS: Brand: MEDLINE

## (undated) DEVICE — SYR 10ML CTRL LR LCK NSAF LF --

## (undated) DEVICE — NEEDLE HYPO 25GA L1.5IN BVL ORIENTED ECLIPSE

## (undated) DEVICE — PACK,EENT,TURBAN DRAPE,PK II: Brand: MEDLINE

## (undated) DEVICE — STERILE POLYISOPRENE POWDER-FREE SURGICAL GLOVES WITH EMOLLIENT COATING: Brand: PROTEXIS

## (undated) DEVICE — SOLUTION IV 1000ML 0.9% SOD CHL

## (undated) DEVICE — 1200 GUARD II KIT W/5MM TUBE W/O VAC TUBE: Brand: GUARDIAN

## (undated) DEVICE — SYR IRR BLB 2OZ DISP BLU STRL -- CONVERT TO ITEM 357637

## (undated) DEVICE — DRAPE MICSCP W46XL120IN FOR ZEISS MD

## (undated) DEVICE — 40418 TRENDELENBURG ONE-STEP ARM PROTECTORS LARGE (1 PAIR): Brand: 40418 TRENDELENBURG ONE-STEP ARM PROTECTORS LARGE (1 PAIR)

## (undated) DEVICE — MASTISOL ADHESIVE LIQ 2/3ML

## (undated) DEVICE — SOLUTION IRRIG 1000ML H2O STRL BLT

## (undated) DEVICE — COVER,MAYO STAND,STERILE: Brand: MEDLINE

## (undated) DEVICE — BLADE OPHTH MINI BEAV SHRP --

## (undated) DEVICE — BLADE TYMPLSTY W2.5MM 60DEG SHRP ALL ARND BVL DN

## (undated) DEVICE — BIPOLAR FORCEPS CORD,BANANA LEADS: Brand: VALLEYLAB

## (undated) DEVICE — Z INACTIVE NO USAGE TURNOVER KIT RM CLEANOP

## (undated) DEVICE — SUTURE PLN GUT SZ 5-0 L18IN ABSRB YELLOWISH TAN L13MM PC-1 1915G

## (undated) DEVICE — TUBING SUCT MASTOID TWO IRRIG SPIK ALL IN 1 LTWT FLX LEVIN

## (undated) DEVICE — COTTON BALLS: Brand: DEROYAL

## (undated) DEVICE — SYR LR LCK 1ML GRAD NSAF 30ML --

## (undated) DEVICE — 1010 S-DRAPE TOWEL DRAPE 10/BX: Brand: STERI-DRAPE™

## (undated) DEVICE — SURGICAL PROCEDURE PACK BASIN MAJ SET CUST NO CAUT